# Patient Record
Sex: MALE | Race: ASIAN | NOT HISPANIC OR LATINO | Employment: OTHER | URBAN - METROPOLITAN AREA
[De-identification: names, ages, dates, MRNs, and addresses within clinical notes are randomized per-mention and may not be internally consistent; named-entity substitution may affect disease eponyms.]

---

## 2019-06-06 ENCOUNTER — DOCUMENTATION ONLY (OUTPATIENT)
Dept: OTHER | Facility: CLINIC | Age: 79
End: 2019-06-06

## 2019-06-06 ENCOUNTER — AMBULATORY - HEALTHEAST (OUTPATIENT)
Dept: OTHER | Facility: CLINIC | Age: 79
End: 2019-06-06

## 2019-07-09 ASSESSMENT — MIFFLIN-ST. JEOR
SCORE: 1229.75
SCORE: 1229.75

## 2019-07-10 ASSESSMENT — MIFFLIN-ST. JEOR
SCORE: 1281
SCORE: 1281

## 2019-07-12 ASSESSMENT — MIFFLIN-ST. JEOR
SCORE: 1225.67
SCORE: 1225.67

## 2019-07-13 ASSESSMENT — MIFFLIN-ST. JEOR
SCORE: 1213.42
SCORE: 1213.42

## 2019-07-15 ASSESSMENT — MIFFLIN-ST. JEOR
SCORE: 1215.69
SCORE: 1215.69

## 2019-07-16 ENCOUNTER — SURGERY - HEALTHEAST (OUTPATIENT)
Dept: SURGERY | Facility: HOSPITAL | Age: 79
End: 2019-07-16

## 2019-07-16 ENCOUNTER — ANESTHESIA - HEALTHEAST (OUTPATIENT)
Dept: SURGERY | Facility: HOSPITAL | Age: 79
End: 2019-07-16

## 2019-07-16 ASSESSMENT — MIFFLIN-ST. JEOR
SCORE: 1220.22
SCORE: 1220.22

## 2019-07-17 ENCOUNTER — HOME CARE/HOSPICE - HEALTHEAST (OUTPATIENT)
Dept: HOME HEALTH SERVICES | Facility: HOME HEALTH | Age: 79
End: 2019-07-17

## 2019-07-18 ASSESSMENT — MIFFLIN-ST. JEOR
SCORE: 1283.27
SCORE: 1283.27

## 2019-07-19 ENCOUNTER — SURGERY - HEALTHEAST (OUTPATIENT)
Dept: SURGERY | Facility: HOSPITAL | Age: 79
End: 2019-07-19

## 2019-07-19 ENCOUNTER — ANESTHESIA - HEALTHEAST (OUTPATIENT)
Dept: SURGERY | Facility: HOSPITAL | Age: 79
End: 2019-07-19

## 2019-07-19 ASSESSMENT — MIFFLIN-ST. JEOR
SCORE: 1266.04
SCORE: 1266.04

## 2019-07-20 ASSESSMENT — MIFFLIN-ST. JEOR
SCORE: 1266.04
SCORE: 1266.04

## 2019-07-22 ASSESSMENT — MIFFLIN-ST. JEOR
SCORE: 1266.04
SCORE: 1266.04

## 2019-07-23 ASSESSMENT — MIFFLIN-ST. JEOR
SCORE: 1270.57
SCORE: 1270.57

## 2019-07-27 ENCOUNTER — HOME CARE/HOSPICE - HEALTHEAST (OUTPATIENT)
Dept: HOME HEALTH SERVICES | Facility: HOME HEALTH | Age: 79
End: 2019-07-27

## 2019-07-28 ENCOUNTER — HOME CARE/HOSPICE - HEALTHEAST (OUTPATIENT)
Dept: HOME HEALTH SERVICES | Facility: HOME HEALTH | Age: 79
End: 2019-07-28

## 2019-07-29 ENCOUNTER — COMMUNICATION - HEALTHEAST (OUTPATIENT)
Dept: VASCULAR SURGERY | Facility: CLINIC | Age: 79
End: 2019-07-29

## 2019-07-29 ENCOUNTER — HOSPITAL ENCOUNTER (INPATIENT)
Facility: CLINIC | Age: 79
End: 2019-07-29
Payer: MEDICARE

## 2019-08-05 ENCOUNTER — RECORDS - HEALTHEAST (OUTPATIENT)
Dept: ADMINISTRATIVE | Facility: OTHER | Age: 79
End: 2019-08-05

## 2019-08-31 ENCOUNTER — COMMUNICATION - HEALTHEAST (OUTPATIENT)
Dept: HOME HEALTH SERVICES | Facility: HOME HEALTH | Age: 79
End: 2019-08-31

## 2019-10-29 ENCOUNTER — HOSPITAL ENCOUNTER (OUTPATIENT)
Dept: INTERVENTIONAL RADIOLOGY/VASCULAR | Facility: HOSPITAL | Age: 79
Discharge: HOME | End: 2019-10-29
Admitting: RADIOLOGY

## 2019-10-29 ENCOUNTER — HOSPITAL ENCOUNTER (OUTPATIENT)
Dept: INTERVENTIONAL RADIOLOGY/VASCULAR | Facility: HOSPITAL | Age: 79
Discharge: HOME | End: 2019-10-29
Attending: NURSE PRACTITIONER

## 2019-10-29 DIAGNOSIS — E46 MALNUTRITION, UNSPECIFIED TYPE (H): ICD-10-CM

## 2019-10-29 DIAGNOSIS — I63.512 ACUTE ISCHEMIC CEREBROVASCULAR ACCIDENT (CVA) INVOLVING LEFT MIDDLE CEREBRAL ARTERY TERRITORY (H): ICD-10-CM

## 2019-12-11 ENCOUNTER — COMMUNICATION - HEALTHEAST (OUTPATIENT)
Dept: SCHEDULING | Facility: CLINIC | Age: 79
End: 2019-12-11

## 2021-01-22 ENCOUNTER — RECORDS - HEALTHEAST (OUTPATIENT)
Dept: LAB | Facility: CLINIC | Age: 81
End: 2021-01-22

## 2021-01-23 ENCOUNTER — RECORDS - HEALTHEAST (OUTPATIENT)
Dept: LAB | Facility: CLINIC | Age: 81
End: 2021-01-23

## 2021-01-24 ENCOUNTER — RECORDS - HEALTHEAST (OUTPATIENT)
Dept: LAB | Facility: CLINIC | Age: 81
End: 2021-01-24

## 2021-01-24 LAB — INR PPP: 1.88 (ref 0.9–1.1)

## 2021-01-25 LAB
HGB BLD-MCNC: 12.2 G/DL (ref 14–18)
INR PPP: 1.82 (ref 0.9–1.1)

## 2021-01-28 ENCOUNTER — RECORDS - HEALTHEAST (OUTPATIENT)
Dept: LAB | Facility: CLINIC | Age: 81
End: 2021-01-28

## 2021-01-28 LAB
ERYTHROCYTE [DISTWIDTH] IN BLOOD BY AUTOMATED COUNT: 17.2 % (ref 11–14.5)
HCT VFR BLD AUTO: 37.8 % (ref 40–54)
HGB BLD-MCNC: 12.5 G/DL (ref 14–18)
INR PPP: 2.74 (ref 0.9–1.1)
MCH RBC QN AUTO: 30 PG (ref 27–34)
MCHC RBC AUTO-ENTMCNC: 33.1 G/DL (ref 32–36)
MCV RBC AUTO: 91 FL (ref 80–100)
PLATELET # BLD AUTO: 249 THOU/UL (ref 140–440)
PMV BLD AUTO: 10.8 FL (ref 8.5–12.5)
RBC # BLD AUTO: 4.16 MILL/UL (ref 4.4–6.2)
WBC: 12.7 THOU/UL (ref 4–11)

## 2021-01-29 ENCOUNTER — RECORDS - HEALTHEAST (OUTPATIENT)
Dept: LAB | Facility: CLINIC | Age: 81
End: 2021-01-29

## 2021-01-29 LAB
INR PPP: 1.86 (ref 0.9–1.1)
SODIUM SERPL-SCNC: 132 MMOL/L (ref 136–145)

## 2021-01-30 ENCOUNTER — RECORDS - HEALTHEAST (OUTPATIENT)
Dept: LAB | Facility: CLINIC | Age: 81
End: 2021-01-30

## 2021-01-30 LAB — INR PPP: 1.66 (ref 0.9–1.1)

## 2021-01-31 LAB — INR PPP: 1.77 (ref 0.9–1.1)

## 2021-02-18 ENCOUNTER — RECORDS - HEALTHEAST (OUTPATIENT)
Dept: LAB | Facility: CLINIC | Age: 81
End: 2021-02-18

## 2021-02-18 LAB
SARS-COV-2 PCR COMMENT: NORMAL
SARS-COV-2 RNA SPEC QL NAA+PROBE: NEGATIVE
SARS-COV-2 VIRUS SPECIMEN SOURCE: NORMAL

## 2021-02-19 LAB
ANION GAP SERPL CALCULATED.3IONS-SCNC: 7 MMOL/L (ref 5–18)
BUN SERPL-MCNC: 22 MG/DL (ref 8–28)
CALCIUM SERPL-MCNC: 8.6 MG/DL (ref 8.5–10.5)
CHLORIDE BLD-SCNC: 103 MMOL/L (ref 98–107)
CO2 SERPL-SCNC: 26 MMOL/L (ref 22–31)
CREAT SERPL-MCNC: 0.75 MG/DL (ref 0.7–1.3)
ERYTHROCYTE [DISTWIDTH] IN BLOOD BY AUTOMATED COUNT: 16.6 % (ref 11–14.5)
GFR SERPL CREATININE-BSD FRML MDRD: >60 ML/MIN/1.73M2
GLUCOSE BLD-MCNC: 94 MG/DL (ref 70–125)
HCT VFR BLD AUTO: 32.7 % (ref 40–54)
HGB BLD-MCNC: 10.2 G/DL (ref 14–18)
MCH RBC QN AUTO: 28.8 PG (ref 27–34)
MCHC RBC AUTO-ENTMCNC: 31.2 G/DL (ref 32–36)
MCV RBC AUTO: 92 FL (ref 80–100)
PLATELET # BLD AUTO: 353 THOU/UL (ref 140–440)
PMV BLD AUTO: 10.6 FL (ref 8.5–12.5)
POTASSIUM BLD-SCNC: 4.8 MMOL/L (ref 3.5–5)
RBC # BLD AUTO: 3.54 MILL/UL (ref 4.4–6.2)
SODIUM SERPL-SCNC: 136 MMOL/L (ref 136–145)
WBC: 8.6 THOU/UL (ref 4–11)

## 2021-02-24 ENCOUNTER — RECORDS - HEALTHEAST (OUTPATIENT)
Dept: LAB | Facility: CLINIC | Age: 81
End: 2021-02-24

## 2021-02-25 LAB
FERRITIN SERPL-MCNC: 394 NG/ML (ref 27–300)
FOLATE SERPL-MCNC: 11.4 NG/ML
HGB BLD-MCNC: 10.3 G/DL (ref 14–18)
IRON SATN MFR SERPL: 28 % (ref 20–50)
IRON SERPL-MCNC: 84 UG/DL (ref 42–175)
LEVETIRACETAM (KEPPRA): 21.3 UG/ML (ref 6–46)
TIBC SERPL-MCNC: 300 UG/DL (ref 313–563)
TRANSFERRIN SERPL-MCNC: 240 MG/DL (ref 212–360)
VIT B12 SERPL-MCNC: 836 PG/ML (ref 213–816)

## 2021-03-04 ENCOUNTER — RECORDS - HEALTHEAST (OUTPATIENT)
Dept: LAB | Facility: CLINIC | Age: 81
End: 2021-03-04

## 2021-03-05 LAB
ANION GAP SERPL CALCULATED.3IONS-SCNC: 10 MMOL/L (ref 5–18)
BUN SERPL-MCNC: 26 MG/DL (ref 8–28)
C REACTIVE PROTEIN LHE: 0.5 MG/DL (ref 0–0.8)
CALCIUM SERPL-MCNC: 8.3 MG/DL (ref 8.5–10.5)
CHLORIDE BLD-SCNC: 103 MMOL/L (ref 98–107)
CO2 SERPL-SCNC: 24 MMOL/L (ref 22–31)
CREAT SERPL-MCNC: 0.72 MG/DL (ref 0.7–1.3)
GFR SERPL CREATININE-BSD FRML MDRD: >60 ML/MIN/1.73M2
GLUCOSE BLD-MCNC: 140 MG/DL (ref 70–125)
MAGNESIUM SERPL-MCNC: 1.8 MG/DL (ref 1.8–2.6)
POTASSIUM BLD-SCNC: 4.3 MMOL/L (ref 3.5–5)
SODIUM SERPL-SCNC: 137 MMOL/L (ref 136–145)

## 2021-05-24 ENCOUNTER — RECORDS - HEALTHEAST (OUTPATIENT)
Dept: ADMINISTRATIVE | Facility: CLINIC | Age: 81
End: 2021-05-24

## 2021-05-25 ENCOUNTER — RECORDS - HEALTHEAST (OUTPATIENT)
Dept: ADMINISTRATIVE | Facility: CLINIC | Age: 81
End: 2021-05-25

## 2021-05-26 ENCOUNTER — RECORDS - HEALTHEAST (OUTPATIENT)
Dept: ADMINISTRATIVE | Facility: CLINIC | Age: 81
End: 2021-05-26

## 2021-05-29 ENCOUNTER — RECORDS - HEALTHEAST (OUTPATIENT)
Dept: ADMINISTRATIVE | Facility: CLINIC | Age: 81
End: 2021-05-29

## 2021-05-30 NOTE — ANESTHESIA PREPROCEDURE EVALUATION
Anesthesia Evaluation        Airway   Mallampati: I  Neck ROM: full   Pulmonary - normal exam                          Cardiovascular - normal exam  Exercise tolerance: > or = 4 METS  (+) hypertension, valvular problems/murmurs (History of MVR), dysrhythmias (Afib), , hypercholesterolemia,     (-) murmur  ECG reviewed (Afib at 66BPM)  Rhythm: irregular  Rate: normal,    no murmur   ROS comment: 2018 TTE  Normal left ventricular size. Mild left ventricular hypertrophy.  Left ventricle ejection fraction is normal. The calculated left ventricular ejection fraction is 61%.  Normal right ventricular systolic function.  Mild biatrial enlargement.  Mechanical valve in the mitral position. Normal prosthetic valve function. Mean gradient 7 mmHg.  Negative bubble study. No observed shunt.  When compared to the previous study dated 6/27/2012, no significant changes observed.     Neuro/Psych    (+) CVA ,     Endo/Other    (+) diabetes mellitus type 2 well controlled, hypothyroidism,      GI/Hepatic/Renal       Other findings: Results for MEDHAT HUNT (MRN [id number]) as of 7/16/2019 13:55    7/16/2019 06:38  Sodium: 136  Potassium: 4.1  Chloride: 104  CO2: 25  Anion Gap, Calculation: 7  BUN: 7 (L)  Creatinine: 0.78  GFR MDRD Af Amer: >60  GFR MDRD Non Af Amer: >60  Results for MEDHAT HUNT (MRN [id number]) as of 7/16/2019 13:55    7/16/2019 06:38  Glucose: 112  INR: 1.14 (H)  WBC: 5.8  RBC: 3.63 (L)  Hemoglobin: 11.0 (L)  Hematocrit: 32.8 (L)  MCV: 90  MCH: 30.3  MCHC: 33.5  RDW: 12.9  Platelets: 279  Results for MEDHAT HUNT (MRN [id number]) as of 7/16/2019 14:27    7/16/2019 06:38  INR: 1.14 (H)        Dental - normal exam                        Anesthesia Plan  Planned anesthetic: general LMA    ASA 3   Induction: intravenous   Anesthetic plan and risks discussed with: patient and spouse  Anesthesia plan special considerations: antiemetics,   Post-op plan: routine recovery

## 2021-05-30 NOTE — ANESTHESIA CARE TRANSFER NOTE
Last vitals:   Vitals:    07/19/19 0442   BP: (!) 149/91   Pulse: (!) 106   Resp: 16   Temp: 37.6  C (99.6  F)   SpO2: 100%     Patient spontaneous RR, -300s, suctioned, following commands, extubated to facemask 10LPM, O2 sats 100%. VSS. Report to RN.    Patient's level of consciousness is drowsy  Spontaneous respirations: yes  Maintains airway independently: yes  Dentition unchanged: yes  Oropharynx: oropharynx clear of all foreign objects    QCDR Measures:  ASA# 20 - Surgical Safety Checklist: WHO surgical safety checklist completed prior to induction    PQRS# 430 - Adult PONV Prevention: 4558F - Pt received => 2 anti-emetic agents (different classes) preop & intraop  ASA# 8 - Peds PONV Prevention: NA - Not pediatric patient, not GA or 2 or more risk factors NOT present  PQRS# 424 - Preeti-op Temp Management: 4559F - At least one body temp DOCUMENTED => 35.5C or 95.9F within required timeframe  PQRS# 426 - PACU Transfer Protocol: - Transfer of care checklist used  ASA# 14 - Acute Post-op Pain: ASA14B - Patient did NOT experience pain >= 7 out of 10

## 2021-05-30 NOTE — ANESTHESIA POSTPROCEDURE EVALUATION
Patient: Javon Cota  REPAIR OF CYSTOTOMY, ABDOMINAL, LAPAROTOMY, EXPLORATORY, CYSTOSCOPY  Anesthesia type: general    Patient location: PACU  Last vitals:   Vitals Value Taken Time   /78 7/19/2019  5:20 AM   Temp 37.6  C (99.6  F) 7/19/2019  4:42 AM   Pulse 102 7/19/2019  5:20 AM   Resp 17 7/19/2019  5:20 AM   SpO2 99 % 7/19/2019  5:20 AM     Post vital signs: stable  Level of consciousness: awake and responds to simple questions  Post-anesthesia pain: pain controlled  Post-anesthesia nausea and vomiting: no  Pulmonary: unassisted, return to baseline  Cardiovascular: stable and blood pressure at baseline  Hydration: adequate  Anesthetic events: no    QCDR Measures:  ASA# 11 - Preeti-op Cardiac Arrest: ASA11B - Patient did NOT experience unanticipated cardiac arrest  ASA# 12 - Preeti-op Mortality Rate: ASA12B - Patient did NOT die  ASA# 13 - PACU Re-Intubation Rate: ASA13B - Patient did NOT require a new airway mgmt  ASA# 10 - Composite Anes Safety: ASA10A - No serious adverse event    Additional Notes:

## 2021-05-30 NOTE — ANESTHESIA PREPROCEDURE EVALUATION
Anesthesia Evaluation      Patient summary reviewed     Airway   Mallampati: II   Pulmonary - negative ROS and normal exam                          Cardiovascular - normal exam  (+) hypertension, ,     ECG reviewed (A-fib)     ROS comment: MVR  EF 61%     Neuro/Psych    (+) CVA ,     Endo/Other    (+) diabetes mellitus, hypothyroidism,      GI/Hepatic/Renal      Comments: Bladder perforation     Other findings: Hb 10.7, K 4.1      Dental - normal exam                        Anesthesia Plan  Planned anesthetic: general endotracheal    ASA 3   Induction: intravenous   Anesthetic plan and risks discussed with: patient  Anesthesia plan special considerations: rapid sequence induction,   Post-op plan: routine recovery

## 2021-05-30 NOTE — TELEPHONE ENCOUNTER
Received [facility] inpatient consult for left ICA stenosis from nurse Ni at [phone]. Paged out to Dr. Paiz who is on-call.

## 2021-05-30 NOTE — ANESTHESIA POSTPROCEDURE EVALUATION
Patient: Javon Cota  CYSTOSCOPY, WITH THROMBUS REMOVAL, BLADDER FULGERATION  Anesthesia type: general    Patient location: PACU  Last vitals:   Vitals Value Taken Time   /77 7/16/2019  7:21 PM   Temp 36.8  C (98.3  F) 7/16/2019  6:49 PM   Pulse 116 7/16/2019  7:28 PM   Resp 15 7/16/2019  7:28 PM   SpO2 99 % 7/16/2019  7:28 PM   Vitals shown include unvalidated device data.  Post vital signs: stable  Level of consciousness: awake and responds to simple questions  Post-anesthesia pain: pain controlled  Post-anesthesia nausea and vomiting: no  Pulmonary: unassisted, return to baseline  Cardiovascular: stable, blood pressure at baseline and continues in Afib with rates just above 100, giving oral BB medication now that was due at 9 PM  Hydration: adequate  Anesthetic events: no    QCDR Measures:  ASA# 11 - Preeti-op Cardiac Arrest: ASA11B - Patient did NOT experience unanticipated cardiac arrest  ASA# 12 - Preeti-op Mortality Rate: ASA12B - Patient did NOT die  ASA# 13 - PACU Re-Intubation Rate: ASA13B - Patient did NOT require a new airway mgmt  ASA# 10 - Composite Anes Safety: ASA10A - No serious adverse event    Additional Notes: overall doing well, urinary catheter with minimal bloody content, no nausea.

## 2021-05-30 NOTE — ANESTHESIA CARE TRANSFER NOTE
Last vitals:   Vitals:    07/16/19 1849   BP: 151/63   Pulse: (!) 101   Resp: 12   Temp: 36.8  C (98.3  F)   SpO2: 100%     Patient's level of consciousness is drowsy  Spontaneous respirations: yes  Maintains airway independently: yes  Dentition unchanged: yes  Oropharynx: oropharynx clear of all foreign objects    QCDR Measures:  ASA# 20 - Surgical Safety Checklist: WHO surgical safety checklist completed prior to induction    PQRS# 430 - Adult PONV Prevention: 4558F - Pt received => 2 anti-emetic agents (different classes) preop & intraop  ASA# 8 - Peds PONV Prevention: NA - Not pediatric patient, not GA or 2 or more risk factors NOT present  PQRS# 424 - Preeti-op Temp Management: 4559F - At least one body temp DOCUMENTED => 35.5C or 95.9F within required timeframe  PQRS# 426 - PACU Transfer Protocol: - Transfer of care checklist used  ASA# 14 - Acute Post-op Pain: ASA14B - Patient did NOT experience pain >= 7 out of 10

## 2021-05-31 NOTE — TELEPHONE ENCOUNTER
(CRS - please advise next steps as needed to /other clinicians)    Patient DC'd from [facility] on 8/[facility]/19 and there were no resumption of care orders for [facility].    Call placed to patient to see if they needed/wanted to resume home care so that orders could be sought.  At the time of call, [facility] was out to see patient to start cares.      Explained to patient that it was their choice of what home care services they would like to have.     Patient chose to stay with [facility] and would like to end services with [facility].    Call placed to Dr. Kang and left message with answering service to update for coordination of care.    Thank you

## 2021-06-02 NOTE — PROGRESS NOTES
Educated patient and spouse about new EnFit syringes and tubing. Spouse return demonstrated how to use it. Both patient and spouse verbalized understanding of teaching.    Amanda Schoenrock

## 2021-06-02 NOTE — PROCEDURES
[facility]    Procedure: IR Procedure Note  Date/Time: 10/29/2019 11:43 AM  Performed by: Edgard John  Authorized by: Edgard John       Universal Protocol    Site marked: Yes    Prior images obtained and reviewed: Yes    Required items: required blood products, implants, devices, and special equipment available    Patient identity confirmed: verbally with patient    Reevaluation: NA - No sedation, light sedation, or local anesthesia    Confirmation checklist: patient's identity using two indicators, relevant allergies, procedure was appropriate and matched the consent or emergent situation and correct equipment/implants were available    Time out: Immediately prior to procedure a time out was called to verify the correct patient, procedure, equipment, support staff and site/side marked as required    Universal Protocol: Joint Commission Universal Protocol was followed    Preparation: Patient was prepped and draped in the usual sterile fashion    Anesthesia  Local anesthesia used?: No    Sedation  Patient sedation: No  Specimens: none  Complications: None  Condition: Stable    Post-procedure    Patient tolerance: Patient tolerated the procedure well with no immediate complications   Length of time physician present for 1:1 monitoring during sedation: 0

## 2021-06-03 VITALS
HEIGHT: 64 IN | BODY MASS INDEX: 24.07 KG/M2 | WEIGHT: 141 LBS | HEIGHT: 64 IN | BODY MASS INDEX: 24.07 KG/M2 | WEIGHT: 141 LBS

## 2021-06-04 NOTE — TELEPHONE ENCOUNTER
Wife calling - no consent on file.  Verbal consent given by patient over the phone.    Wife says he has feeding tube in stomach and they want it removed so he can eat by mouth.  Says it was placed a couple of months ago while he was in the hospital after having a stroke.  He no longer gets home care visits.    Advised caller to contact patient's primary care physician about this.  They may need to see him in clinic and order any necessary testing to be sure he is safe to eat by mouth again.  Then it would be decided if the tube should be removed.  Caller agrees to call his primary care physician at [facility].    Arlen Jacques RN  Triage Nurse Advisor    Reason for Disposition    Cleaning and dressing the g-tube site (or j-tube), questions about     Questions about discontinuing tube feedings and transitioning to eating by mouth.    Protocols used: FEEDING TUBE SYMPTOMS AND [id number]

## 2022-06-02 ENCOUNTER — APPOINTMENT (OUTPATIENT)
Dept: CT IMAGING | Facility: HOSPITAL | Age: 82
DRG: 686 | End: 2022-06-02
Attending: EMERGENCY MEDICINE
Payer: COMMERCIAL

## 2022-06-02 ENCOUNTER — HOSPITAL ENCOUNTER (INPATIENT)
Facility: HOSPITAL | Age: 82
LOS: 11 days | Discharge: HOME | DRG: 686 | End: 2022-06-13
Attending: EMERGENCY MEDICINE | Admitting: INTERNAL MEDICINE
Payer: COMMERCIAL

## 2022-06-02 DIAGNOSIS — I48.20 CHRONIC ATRIAL FIBRILLATION (H): ICD-10-CM

## 2022-06-02 DIAGNOSIS — C68.9 TRANSITIONAL CELL CARCINOMA (H): Primary | ICD-10-CM

## 2022-06-02 DIAGNOSIS — C67.9 MALIGNANT NEOPLASM OF URINARY BLADDER, UNSPECIFIED SITE (H): ICD-10-CM

## 2022-06-02 DIAGNOSIS — K59.09 OTHER CONSTIPATION: ICD-10-CM

## 2022-06-02 DIAGNOSIS — E03.9 HYPOTHYROIDISM, UNSPECIFIED TYPE: ICD-10-CM

## 2022-06-02 DIAGNOSIS — I63.512 ACUTE ISCHEMIC CEREBROVASCULAR ACCIDENT (CVA) INVOLVING LEFT MIDDLE CEREBRAL ARTERY TERRITORY (H): ICD-10-CM

## 2022-06-02 DIAGNOSIS — R31.0 GROSS HEMATURIA: ICD-10-CM

## 2022-06-02 DIAGNOSIS — Z95.2 H/O MITRAL VALVE REPLACEMENT WITH MECHANICAL VALVE: ICD-10-CM

## 2022-06-02 PROBLEM — I10 HYPERTENSION: Status: ACTIVE | Noted: 2019-07-13

## 2022-06-02 PROBLEM — R31.9 HEMATURIA: Status: ACTIVE | Noted: 2019-07-10

## 2022-06-02 PROBLEM — Z93.4 GASTROJEJUNOSTOMY TUBE STATUS (H): Status: ACTIVE | Noted: 2019-08-08

## 2022-06-02 PROBLEM — Z86.73 HISTORY OF STROKE: Status: ACTIVE | Noted: 2022-06-02

## 2022-06-02 PROBLEM — I50.30 (HFPEF) HEART FAILURE WITH PRESERVED EJECTION FRACTION (H): Status: ACTIVE | Noted: 2018-06-25

## 2022-06-02 PROBLEM — N32.89 EXTRAPERITONEAL BLADDER PERFORATION: Status: ACTIVE | Noted: 2019-07-19

## 2022-06-02 PROBLEM — D62 ANEMIA DUE TO BLOOD LOSS, ACUTE: Status: ACTIVE | Noted: 2022-06-02

## 2022-06-02 LAB
ABO/RH(D): NORMAL
ALBUMIN UR-MCNC: 100 MG/DL
ANION GAP SERPL CALCULATED.3IONS-SCNC: 9 MMOL/L (ref 5–18)
ANTIBODY SCREEN: NEGATIVE
APPEARANCE UR: ABNORMAL
BACTERIA #/AREA URNS HPF: ABNORMAL /HPF
BASOPHILS # BLD AUTO: 0 10E3/UL (ref 0–0.2)
BASOPHILS NFR BLD AUTO: 1 %
BILIRUB UR QL STRIP: NEGATIVE
BUN SERPL-MCNC: 16 MG/DL (ref 8–28)
CALCIUM SERPL-MCNC: 9.4 MG/DL (ref 8.5–10.5)
CHLORIDE BLD-SCNC: 105 MMOL/L (ref 98–107)
CO2 SERPL-SCNC: 24 MMOL/L (ref 22–31)
COLOR UR AUTO: ABNORMAL
CREAT SERPL-MCNC: 0.89 MG/DL (ref 0.7–1.3)
EOSINOPHIL # BLD AUTO: 0.3 10E3/UL (ref 0–0.7)
EOSINOPHIL NFR BLD AUTO: 5 %
ERYTHROCYTE [DISTWIDTH] IN BLOOD BY AUTOMATED COUNT: 12.7 % (ref 10–15)
GFR SERPL CREATININE-BSD FRML MDRD: 87 ML/MIN/1.73M2
GLUCOSE BLD-MCNC: 122 MG/DL (ref 70–125)
GLUCOSE UR STRIP-MCNC: NEGATIVE MG/DL
HCT VFR BLD AUTO: 46.6 % (ref 40–53)
HGB BLD-MCNC: 15.3 G/DL (ref 13.3–17.7)
HGB UR QL STRIP: ABNORMAL
IMM GRANULOCYTES # BLD: 0 10E3/UL
IMM GRANULOCYTES NFR BLD: 0 %
INR PPP: 2.63 (ref 0.85–1.15)
KETONES UR STRIP-MCNC: NEGATIVE MG/DL
LEUKOCYTE ESTERASE UR QL STRIP: ABNORMAL
LYMPHOCYTES # BLD AUTO: 1.8 10E3/UL (ref 0.8–5.3)
LYMPHOCYTES NFR BLD AUTO: 27 %
MCH RBC QN AUTO: 30.2 PG (ref 26.5–33)
MCHC RBC AUTO-ENTMCNC: 32.8 G/DL (ref 31.5–36.5)
MCV RBC AUTO: 92 FL (ref 78–100)
MONOCYTES # BLD AUTO: 0.6 10E3/UL (ref 0–1.3)
MONOCYTES NFR BLD AUTO: 9 %
NEUTROPHILS # BLD AUTO: 3.9 10E3/UL (ref 1.6–8.3)
NEUTROPHILS NFR BLD AUTO: 58 %
NITRATE UR QL: NEGATIVE
NRBC # BLD AUTO: 0 10E3/UL
NRBC BLD AUTO-RTO: 0 /100
PH UR STRIP: 6.5 [PH] (ref 5–7)
PLATELET # BLD AUTO: 193 10E3/UL (ref 150–450)
POTASSIUM BLD-SCNC: 4 MMOL/L (ref 3.5–5)
RBC # BLD AUTO: 5.07 10E6/UL (ref 4.4–5.9)
RBC URINE: >182 /HPF
SARS-COV-2 RNA RESP QL NAA+PROBE: NEGATIVE
SODIUM SERPL-SCNC: 138 MMOL/L (ref 136–145)
SP GR UR STRIP: 1.01 (ref 1–1.03)
SPECIMEN EXPIRATION DATE: NORMAL
SQUAMOUS EPITHELIAL: <1 /HPF
UROBILINOGEN UR STRIP-MCNC: <2 MG/DL
WBC # BLD AUTO: 6.7 10E3/UL (ref 4–11)
WBC URINE: 30 /HPF

## 2022-06-02 PROCEDURE — 250N000013 HC RX MED GY IP 250 OP 250 PS 637: Performed by: EMERGENCY MEDICINE

## 2022-06-02 PROCEDURE — 36415 COLL VENOUS BLD VENIPUNCTURE: CPT | Performed by: EMERGENCY MEDICINE

## 2022-06-02 PROCEDURE — 96361 HYDRATE IV INFUSION ADD-ON: CPT

## 2022-06-02 PROCEDURE — 87635 SARS-COV-2 COVID-19 AMP PRB: CPT | Performed by: EMERGENCY MEDICINE

## 2022-06-02 PROCEDURE — 96360 HYDRATION IV INFUSION INIT: CPT

## 2022-06-02 PROCEDURE — 87086 URINE CULTURE/COLONY COUNT: CPT | Performed by: EMERGENCY MEDICINE

## 2022-06-02 PROCEDURE — 120N000001 HC R&B MED SURG/OB

## 2022-06-02 PROCEDURE — 81001 URINALYSIS AUTO W/SCOPE: CPT | Performed by: EMERGENCY MEDICINE

## 2022-06-02 PROCEDURE — 99223 1ST HOSP IP/OBS HIGH 75: CPT | Performed by: INTERNAL MEDICINE

## 2022-06-02 PROCEDURE — C9803 HOPD COVID-19 SPEC COLLECT: HCPCS

## 2022-06-02 PROCEDURE — 250N000011 HC RX IP 250 OP 636: Performed by: EMERGENCY MEDICINE

## 2022-06-02 PROCEDURE — 80048 BASIC METABOLIC PNL TOTAL CA: CPT | Performed by: EMERGENCY MEDICINE

## 2022-06-02 PROCEDURE — 74178 CT ABD&PLV WO CNTR FLWD CNTR: CPT

## 2022-06-02 PROCEDURE — 85025 COMPLETE CBC W/AUTO DIFF WBC: CPT | Performed by: EMERGENCY MEDICINE

## 2022-06-02 PROCEDURE — 85610 PROTHROMBIN TIME: CPT | Performed by: EMERGENCY MEDICINE

## 2022-06-02 PROCEDURE — 86850 RBC ANTIBODY SCREEN: CPT | Performed by: EMERGENCY MEDICINE

## 2022-06-02 PROCEDURE — 99285 EMERGENCY DEPT VISIT HI MDM: CPT | Mod: 25

## 2022-06-02 RX ORDER — WARFARIN SODIUM 5 MG/1
5 TABLET ORAL SEE ADMIN INSTRUCTIONS
Status: DISCONTINUED | OUTPATIENT
Start: 2022-06-02 | End: 2022-06-02 | Stop reason: DRUGHIGH

## 2022-06-02 RX ORDER — CALCIUM CARBONATE 500 MG/1
1000 TABLET, CHEWABLE ORAL 4 TIMES DAILY PRN
Status: DISCONTINUED | OUTPATIENT
Start: 2022-06-02 | End: 2022-06-13 | Stop reason: HOSPADM

## 2022-06-02 RX ORDER — POLYETHYLENE GLYCOL 3350 17 G/17G
17 POWDER, FOR SOLUTION ORAL 2 TIMES DAILY PRN
Status: DISCONTINUED | OUTPATIENT
Start: 2022-06-02 | End: 2022-06-02

## 2022-06-02 RX ORDER — LEVOTHYROXINE SODIUM 25 UG/1
50 TABLET ORAL
Status: DISCONTINUED | OUTPATIENT
Start: 2022-06-03 | End: 2022-06-13 | Stop reason: HOSPADM

## 2022-06-02 RX ORDER — OXYCODONE HYDROCHLORIDE 5 MG/1
5-10 TABLET ORAL EVERY 4 HOURS PRN
Status: DISCONTINUED | OUTPATIENT
Start: 2022-06-02 | End: 2022-06-02

## 2022-06-02 RX ORDER — ACETAMINOPHEN 650 MG/1
650 SUPPOSITORY RECTAL EVERY 6 HOURS PRN
Status: DISCONTINUED | OUTPATIENT
Start: 2022-06-02 | End: 2022-06-13 | Stop reason: HOSPADM

## 2022-06-02 RX ORDER — IOPAMIDOL 755 MG/ML
100 INJECTION, SOLUTION INTRAVASCULAR ONCE
Status: COMPLETED | OUTPATIENT
Start: 2022-06-02 | End: 2022-06-02

## 2022-06-02 RX ORDER — ATORVASTATIN CALCIUM 10 MG/1
10 TABLET, FILM COATED ORAL AT BEDTIME
Status: DISCONTINUED | OUTPATIENT
Start: 2022-06-03 | End: 2022-06-13 | Stop reason: HOSPADM

## 2022-06-02 RX ORDER — LEVOTHYROXINE SODIUM 25 UG/1
50 TABLET ORAL
Status: DISCONTINUED | OUTPATIENT
Start: 2022-06-03 | End: 2022-06-02

## 2022-06-02 RX ORDER — ATORVASTATIN CALCIUM 10 MG/1
10 TABLET, FILM COATED ORAL AT BEDTIME
Status: DISCONTINUED | OUTPATIENT
Start: 2022-06-02 | End: 2022-06-02

## 2022-06-02 RX ORDER — LANOLIN ALCOHOL/MO/W.PET/CERES
3 CREAM (GRAM) TOPICAL
Status: DISCONTINUED | OUTPATIENT
Start: 2022-06-02 | End: 2022-06-13 | Stop reason: HOSPADM

## 2022-06-02 RX ORDER — POLYETHYLENE GLYCOL 3350 17 G/17G
17 POWDER, FOR SOLUTION ORAL 2 TIMES DAILY PRN
Status: DISCONTINUED | OUTPATIENT
Start: 2022-06-02 | End: 2022-06-13 | Stop reason: HOSPADM

## 2022-06-02 RX ORDER — ONDANSETRON 4 MG/1
4 TABLET, ORALLY DISINTEGRATING ORAL EVERY 6 HOURS PRN
Status: DISCONTINUED | OUTPATIENT
Start: 2022-06-02 | End: 2022-06-13 | Stop reason: HOSPADM

## 2022-06-02 RX ORDER — METOPROLOL TARTRATE 25 MG/1
50 TABLET, FILM COATED ORAL 2 TIMES DAILY
Status: DISCONTINUED | OUTPATIENT
Start: 2022-06-02 | End: 2022-06-13 | Stop reason: HOSPADM

## 2022-06-02 RX ORDER — AMOXICILLIN 250 MG
1 CAPSULE ORAL DAILY
Status: DISCONTINUED | OUTPATIENT
Start: 2022-06-03 | End: 2022-06-08

## 2022-06-02 RX ORDER — LOSARTAN POTASSIUM AND HYDROCHLOROTHIAZIDE 12.5; 5 MG/1; MG/1
1 TABLET ORAL DAILY
Status: DISCONTINUED | OUTPATIENT
Start: 2022-06-03 | End: 2022-06-02 | Stop reason: ALTCHOICE

## 2022-06-02 RX ORDER — LOSARTAN POTASSIUM 50 MG/1
50 TABLET ORAL EVERY EVENING
COMMUNITY
Start: 2022-05-25

## 2022-06-02 RX ORDER — AMOXICILLIN 250 MG
1 CAPSULE ORAL DAILY
Status: DISCONTINUED | OUTPATIENT
Start: 2022-06-03 | End: 2022-06-02

## 2022-06-02 RX ORDER — OXYCODONE HYDROCHLORIDE 5 MG/1
5-10 TABLET ORAL EVERY 4 HOURS PRN
Status: DISCONTINUED | OUTPATIENT
Start: 2022-06-02 | End: 2022-06-13 | Stop reason: HOSPADM

## 2022-06-02 RX ORDER — WARFARIN SODIUM 2 MG/1
4 TABLET ORAL ONCE
Status: COMPLETED | OUTPATIENT
Start: 2022-06-02 | End: 2022-06-02

## 2022-06-02 RX ORDER — ACETAMINOPHEN 325 MG/1
650 TABLET ORAL EVERY 6 HOURS PRN
Status: DISCONTINUED | OUTPATIENT
Start: 2022-06-02 | End: 2022-06-13 | Stop reason: HOSPADM

## 2022-06-02 RX ORDER — ONDANSETRON 2 MG/ML
4 INJECTION INTRAMUSCULAR; INTRAVENOUS EVERY 6 HOURS PRN
Status: DISCONTINUED | OUTPATIENT
Start: 2022-06-02 | End: 2022-06-13 | Stop reason: HOSPADM

## 2022-06-02 RX ORDER — CHOLECALCIFEROL (VITAMIN D3) 50 MCG
1 TABLET ORAL DAILY
COMMUNITY

## 2022-06-02 RX ADMIN — WARFARIN SODIUM 4 MG: 2 TABLET ORAL at 23:11

## 2022-06-02 RX ADMIN — IOPAMIDOL 100 ML: 755 INJECTION, SOLUTION INTRAVENOUS at 19:49

## 2022-06-02 ASSESSMENT — ACTIVITIES OF DAILY LIVING (ADL): ADLS_ACUITY_SCORE: 35

## 2022-06-02 NOTE — ED PROVIDER NOTES
EMERGENCY DEPARTMENT ENCOUNTER            IMPRESSION:  Transitional bladder cancer  Anticoagulated  Gross hematuria      MEDICAL DECISION MAKING:  Patient with history of transitional bladder cancer here for evaluation of gross hematuria.  He is on Coumadin for mitral valve replacement.  [facility].  He is taking an immune modulator.    On exam his vital signs are normal.  There is no abdominal tenderness.  His genitals are normal.  Urine is a zinfandel color.     Chemistry and chemistry are normal    INR is 2.63    Urine shows gross hematuria but no evidence of infection    CT shows evidence of bladder mass with extension into the bladder wall    Management was discussed with on-call urology.  Urology recommended CBI overnight with urology consult in the morning    Jacques catheter was ordered    I have placed a call to the hospitalist              =================================================================  CHIEF COMPLAINT:  Chief Complaint   Patient presents with     Hematuria         HPI  Javon Cota is a 81 year old male with a history of acquired hypothyroidism, dyslipidemia, HTN, acute ischemic CVA, anemia, chronic a-fib, gross hematuria, perforation of bladder (2020), s/p GJ tube placement 2019, s/p mitral valve replacement (on Coumadin) and transitional cell carcinoma, who presents to the ED by [facility] for evaluation of hematuria.   Patient reports hematuria began at 1200 (~7 hours ago), but denies any associated pain. Patient denies any trauma or injury that could have contributed to onset of symptoms. Patient denies fever or any additional medical concerns or complaints at this time.     Per chart review, patient was recently seen for follow up 5/23/22 at [facility] oncology regarding his diagnosis of transitional cell carcinoma. This was previously diagnosed in April via PET scan. Patient has been placed on pembrolizumab 200 mg IV every 3  weeks for chemotherapy treatment. Plan is to follow up in 3 and 6 weeks for repeat CBC diff, chem 10, liver function test, TSH, and follow up with CNP. Response to chemotherapy will be assessed after 3 months with cystoscopy and CT chest, abdomen, pelvis.       I Veronicae Gryler am serving as a scribe to document services personally performed by Dr. Jose Pearson MD, based on my observation and the provider's statements to me. I, Dr. Jose Pearson MD attest that Elise Gryler is acting in a scribe capacity, has observed my performance of the services and has documented them in accordance with my direction.      REVIEW OF SYSTEMS   Constitutional: Denies fever, chills, unintentional weight loss or fatigue   Eyes: Denies visual changes or discharge    HENT: Denies sore throat, ear pain or neck pain  Respiratory: Denies cough or shortness of breath    Cardiovascular: Denies chest pain, palpitations or leg swelling  GI: Denies abdominal pain, nausea, vomiting, or dark, bloody stools.    : Denies, dysuria, or flank pain. Patient endorses hematuria.   Musculoskeletal: Denies any new back pain or new muscle/joint pains  Skin: Denies rash or wound  Neurologic: Denies current headache, new weakness, focal weakness, or sensory changes    Lymphatic: Denies swollen glands    Psychiatric: Denies depression, suicidal ideation or homicidal ideation.    Remainder of systems reviewed, unless noted in HPI all others negative.      PAST MEDICAL HISTORY:  History reviewed. No pertinent past medical history.    PAST SURGICAL HISTORY:  Past Surgical History:   Procedure Laterality Date     CYSTOSCOPY N/A 7/19/2019    Procedure: CYSTOSCOPY;  Surgeon: Emmett Almonte MD;  Location: [facility] [place];  Service: Urology     IR GASTRO JEJUNOSTOMY TUBE CHANGE  10/29/2019     IR GASTRO JEJUNOSTOMY TUBE PLACEMENT  8/6/2019     IR GJ TUBE INSERTION  8/6/2019     IR GJ TUBE REPLACEMENT  10/29/2019     IR T-FASTENER REMOVAL  10/29/2019     IR  "T-FASTENER REMOVAL  10/29/2019     LAPAROTOMY EXPLORATORY N/A 7/19/2019    Procedure: LAPAROTOMY, EXPLORATORY;  Surgeon: Emmett Almonte MD;  Location: [facility] [place];  Service: Urology     NJ CYSTOURETHROSCOPY W/IRRIG & EVAC CLOTS N/A 7/16/2019    Procedure: CYSTOSCOPY, WITH THROMBUS REMOVAL, BLADDER FULGERATION;  Surgeon: Ravinder Byrne MD;  Location: [facility] [place];  Service: Urology     REPLACE VALVE MITRAL           CURRENT MEDICATIONS:    amoxicillin (AMOXIL) 500 MG capsule  atorvastatin (LIPITOR) 10 MG tablet  levothyroxine (SYNTHROID, LEVOTHROID) 50 MCG tablet  losartan-hydrochlorothiazide (HYZAAR) 50-12.5 mg per tablet  metoprolol tartrate (LOPRESSOR) 50 MG tablet  neomycin-bacitracin-polymyxin (NEOSPORIN) ointment  oxyCODONE (ROXICODONE) 5 MG immediate release tablet  polyethylene glycol (MIRALAX) 17 gram packet  senna-docusate (SENNOSIDES-DOCUSATE SODIUM) 8.6-50 mg tablet  warfarin (COUMADIN/JANTOVEN) 2 MG tablet        ALLERGIES:  No Known Allergies    FAMILY HISTORY:  Family History   Problem Relation Age of Onset     Cataracts No family hx of      Glaucoma No family hx of      Macular Degeneration No family hx of        SOCIAL HISTORY:   Social History     Socioeconomic History     Marital status:    Tobacco Use     Smoking status: Never Smoker     Smokeless tobacco: Never Used   Substance and Sexual Activity     Alcohol use: No     Drug use: No       PHYSICAL EXAM:    BP (!) 114/95   Pulse 88   Temp 98.2  F (36.8  C) (Oral)   Resp 20   Ht 1.676 m (5' 6\")   Wt 55.3 kg (122 lb)   SpO2 100%   BMI 19.69 kg/m      Constitutional: Awake, alert, in no acute distress  Head: Normocephalic, atraumatic.  ENT: Mucous membranes moist. Posterior oropharynx appears normal.  Eyes: Pupils midrange and reactive ,no conjunctival discharge  Neck: No lymphadenopathy, no stridor, supple, no soft tissue swelling  Chest: No tenderness   Respiratory: Respirations even, unlabored. Lungs clear to " ascultation bilaterally, in no acute respiratory distress.  Cardiovascular: Regular rate and rhythm.+2 radial pulses, equal bilaterally.  No murmurs.   GI: Abdomen soft, non-tender to palpation in all 4 quadrants. No guarding or rebound. Bowel sounds intact on all 4 quadrants.  Faint gross hematuria at the meatus  Back: No CVA tenderness.    Musculoskeletal: Moves all 4 extremities equally, strength symmetrical on bilateral uppers and lowers.  No peripheral edema  Integument: Warm, dry. No rash. No bruising or petechiae.  Lymphatic: No cervical lymphadenopathy  Neurologic: Alert & oriented x 3. Normal speech. Grossly normal motor and sensory function. No focal deficits noted.  NIHSS = 0  Psychiatric: Normal mood and affect. Normal judgement.    ED COURSE:    7:05 PM I met with the patient to gather history and to perform my initial exam. I discussed the plan for care while in the Emergency Department. PPE (gloves,surgical mask) was worn during patient encounters.   9:25 PM I consulted with [facility] [place] [facility] on the phone regarding patient care.  9:34 PM I updated the patient.      LAB:  All pertinent labs reviewed and interpreted.  Results for orders placed or performed during the hospital encounter of 06/02/22   CT Urogram wo & w Contrast    Impression    IMPRESSION:  1.  Multifocal bladder masses with a dominant lesion right anterior portion of the bladder which has transmural extension through the bladder wall. There is hazy changes of the perivesicular fat which could reflect tumor extension. The other separate   bladder wall lesions are smaller but likely reflect the same process and represent multifocal transitional cell carcinoma.  2.  No lymphadenopathy or sclerotic bone lesions.  3.  No evidence for ureteral obstruction.  4.  Moderate cortical scarring right kidney with benign right renal cyst requires no follow-up. The left kidney appears normal.  5.  Increased stool volume throughout  the colon without obstruction.  6.  Mild prostate gland enlargement.   Basic metabolic panel   Result Value Ref Range    Sodium 138 136 - 145 mmol/L    Potassium 4.0 3.5 - 5.0 mmol/L    Chloride 105 98 - 107 mmol/L    Carbon Dioxide (CO2) 24 22 - 31 mmol/L    Anion Gap 9 5 - 18 mmol/L    Urea Nitrogen 16 8 - 28 mg/dL    Creatinine 0.89 0.70 - 1.30 mg/dL    Calcium 9.4 8.5 - 10.5 mg/dL    Glucose 122 70 - 125 mg/dL    GFR Estimate 87 >60 mL/min/1.73m2   Result Value Ref Range    INR 2.63 (H) 0.85 - 1.15   UA with Microscopic reflex to Culture    Specimen: Urine, Midstream   Result Value Ref Range    Color Urine Red (A) Colorless, Straw, Light Yellow, Yellow    Appearance Urine Cloudy (A) Clear    Glucose Urine Negative Negative mg/dL    Bilirubin Urine Negative Negative    Ketones Urine Negative Negative mg/dL    Specific Gravity Urine 1.009 1.001 - 1.030    Blood Urine >1.0 mg/dL (A) Negative    pH Urine 6.5 5.0 - 7.0    Protein Albumin Urine 100  (A) Negative mg/dL    Urobilinogen Urine <2.0 <2.0 mg/dL    Nitrite Urine Negative Negative    Leukocyte Esterase Urine 75 Denis/uL (A) Negative    Bacteria Urine Few (A) None Seen /HPF    RBC Urine >182 (H) <=2 /HPF    WBC Urine 30 (H) <=5 /HPF    Squamous Epithelials Urine <1 <=1 /HPF   CBC with platelets and differential   Result Value Ref Range    WBC Count 6.7 4.0 - 11.0 10e3/uL    RBC Count 5.07 4.40 - 5.90 10e6/uL    Hemoglobin 15.3 13.3 - 17.7 g/dL    Hematocrit 46.6 40.0 - 53.0 %    MCV 92 78 - 100 fL    MCH 30.2 26.5 - 33.0 pg    MCHC 32.8 31.5 - 36.5 g/dL    RDW 12.7 10.0 - 15.0 %    Platelet Count 193 150 - 450 10e3/uL    % Neutrophils 58 %    % Lymphocytes 27 %    % Monocytes 9 %    % Eosinophils 5 %    % Basophils 1 %    % Immature Granulocytes 0 %    NRBCs per 100 WBC 0 <1 /100    Absolute Neutrophils 3.9 1.6 - 8.3 10e3/uL    Absolute Lymphocytes 1.8 0.8 - 5.3 10e3/uL    Absolute Monocytes 0.6 0.0 - 1.3 10e3/uL    Absolute Eosinophils 0.3 0.0 - 0.7 10e3/uL     Absolute Basophils 0.0 0.0 - 0.2 10e3/uL    Absolute Immature Granulocytes 0.0 <=0.4 10e3/uL    Absolute NRBCs 0.0 10e3/uL   Adult Type and Screen   Result Value Ref Range    ABO/RH(D) A POS     Antibody Screen Negative Negative    SPECIMEN EXPIRATION DATE [id number]        RADIOLOGY:  Reviewed all pertinent imaging. Please see official radiology report.  CT Urogram wo & w Contrast   Final Result   IMPRESSION:   1.  Multifocal bladder masses with a dominant lesion right anterior portion of the bladder which has transmural extension through the bladder wall. There is hazy changes of the perivesicular fat which could reflect tumor extension. The other separate    bladder wall lesions are smaller but likely reflect the same process and represent multifocal transitional cell carcinoma.   2.  No lymphadenopathy or sclerotic bone lesions.   3.  No evidence for ureteral obstruction.   4.  Moderate cortical scarring right kidney with benign right renal cyst requires no follow-up. The left kidney appears normal.   5.  Increased stool volume throughout the colon without obstruction.   6.  Mild prostate gland enlargement.               MEDICATIONS GIVEN IN THE EMERGENCY:  Medications   iopamidol (ISOVUE-370) solution 100 mL (100 mLs Intravenous Given 6/2/22 1949)           NEW PRESCRIPTIONS STARTED AT TODAY'S ER VISIT:  New Prescriptions    No medications on file          FINAL DIAGNOSIS:    ICD-10-CM    1. Gross hematuria  R31.0    2. Malignant neoplasm of urinary bladder, unspecified site (H)  C67.9             At the conclusion of the encounter I discussed the results of all of the tests and the disposition. The questions were answered. The patient or family acknowledged understanding and was agreeable with the care plan.     NAME: Javon Cota  AGE: 81 year old male  YOB: 1940  MRN: [id number]  EVALUATION DATE & TIME: No admission date for patient encounter.    [facility]  [place]    ED PROVIDER: Jose Pearson M.D.      I, Elise Gryler, am serving as a scribe to document services personally performed by Dr. Jose Pearson based on my observation and the provider's statements to me. I, Jose Pearson MD attest that Elise Gryler is acting in a scribe capacity, has observed my performance of the services and has documented them in accordance with my direction.    Jose Pearson M.D.  Emergency Medicine  [facility] EMERGENCY DEPARTMENT  [address] [place] [id number]  [phone]  Dept: [phone]  6/2/2022         Jose Pearson MD  06/02/22 0739

## 2022-06-02 NOTE — ED TRIAGE NOTES
Patient arrives to triage from home via [facility] with chief compliant of blood in urine which started today.  Per EMS, , normal heart rate and sats 100% on RA.  Patient alert and oriented x4.

## 2022-06-02 NOTE — ED NOTES
"ED Provider In Triage Note  [facility]  Encounter Date: Jun 2, 2022    Chief Complaint   Patient presents with     Hematuria       Brief HPI:   Javon Cota is a 81 year old male presenting to the Emergency Department with a chief complaint of gross hematuria today . Pt on coumadin for mechanical heart valve.  Denies pain, fevers.    Brief Physical Exam:  BP (!) 141/83 (BP Location: Left arm)   Pulse 74   Temp 98.2  F (36.8  C) (Oral)   Resp 20   Ht 1.676 m (5' 6\")   Wt 55.3 kg (122 lb)   SpO2 96%   BMI 19.69 kg/m    General: Non-toxic appearing  HEENT: Atraumatic  Resp: No respiratory distress  Abdomen: Non-peritoneal  Neuro: Alert, oriented, answers questions appropriately  Psych: Behavior appropriate      Plan Initiated in Triage:  Orders Placed This Encounter     Basic metabolic panel     INR     UA with Microscopic reflex to Culture     CBC with platelets and differential       PIT Dispo:   Return to lobby while awaiting workup and ED bed availability    Justin Shabazz MD on 6/2/2022 at 4:17 PM    Patient was evaluated by the Physician in Triage due to a limitation of available rooms in the Emergency Department. A plan of care was discussed based on the information obtained on the initial evaluation and patient was consuled to return back to the Emergency Department lobby after this initial evalutaiton until results were obtained or a room became available in the Emergency Department. Patient was counseled not to leave prior to receiving the results of their workup.     Justin Shabazz MD  [facility] EMERGENCY DEPARTMENT  [address] [place] [id number]  [phone]     Justin Shabazz MD  06/02/22 1618    "

## 2022-06-03 ENCOUNTER — APPOINTMENT (OUTPATIENT)
Dept: ULTRASOUND IMAGING | Facility: HOSPITAL | Age: 82
DRG: 686 | End: 2022-06-03
Payer: COMMERCIAL

## 2022-06-03 LAB
ERYTHROCYTE [DISTWIDTH] IN BLOOD BY AUTOMATED COUNT: 12.7 % (ref 10–15)
HCT VFR BLD AUTO: 43.5 % (ref 40–53)
HGB BLD-MCNC: 14.5 G/DL (ref 13.3–17.7)
INR PPP: 2.59 (ref 0.85–1.15)
MCH RBC QN AUTO: 30.2 PG (ref 26.5–33)
MCHC RBC AUTO-ENTMCNC: 33.3 G/DL (ref 31.5–36.5)
MCV RBC AUTO: 91 FL (ref 78–100)
PLATELET # BLD AUTO: 178 10E3/UL (ref 150–450)
RBC # BLD AUTO: 4.8 10E6/UL (ref 4.4–5.9)
WBC # BLD AUTO: 8.8 10E3/UL (ref 4–11)

## 2022-06-03 PROCEDURE — 258N000003 HC RX IP 258 OP 636: Performed by: INTERNAL MEDICINE

## 2022-06-03 PROCEDURE — 120N000001 HC R&B MED SURG/OB

## 2022-06-03 PROCEDURE — 99233 SBSQ HOSP IP/OBS HIGH 50: CPT | Performed by: INTERNAL MEDICINE

## 2022-06-03 PROCEDURE — 3E1K78Z IRRIGATION OF GENITOURINARY TRACT USING IRRIGATING SUBSTANCE, VIA NATURAL OR ARTIFICIAL OPENING: ICD-10-PCS | Performed by: PHYSICIAN ASSISTANT

## 2022-06-03 PROCEDURE — 250N000013 HC RX MED GY IP 250 OP 250 PS 637: Performed by: INTERNAL MEDICINE

## 2022-06-03 PROCEDURE — 85610 PROTHROMBIN TIME: CPT | Performed by: INTERNAL MEDICINE

## 2022-06-03 PROCEDURE — 85027 COMPLETE CBC AUTOMATED: CPT | Performed by: INTERNAL MEDICINE

## 2022-06-03 PROCEDURE — 76857 US EXAM PELVIC LIMITED: CPT

## 2022-06-03 PROCEDURE — 36415 COLL VENOUS BLD VENIPUNCTURE: CPT | Performed by: INTERNAL MEDICINE

## 2022-06-03 RX ORDER — WARFARIN SODIUM 3 MG/1
3 TABLET ORAL
Status: COMPLETED | OUTPATIENT
Start: 2022-06-03 | End: 2022-06-03

## 2022-06-03 RX ORDER — LOSARTAN POTASSIUM 50 MG/1
50 TABLET ORAL DAILY
Status: DISCONTINUED | OUTPATIENT
Start: 2022-06-03 | End: 2022-06-13 | Stop reason: HOSPADM

## 2022-06-03 RX ADMIN — DEXTROSE AND SODIUM CHLORIDE: 5; 900 INJECTION, SOLUTION INTRAVENOUS at 11:54

## 2022-06-03 RX ADMIN — LOSARTAN POTASSIUM 50 MG: 50 TABLET, FILM COATED ORAL at 12:49

## 2022-06-03 RX ADMIN — SENNOSIDES AND DOCUSATE SODIUM 1 TABLET: 8.6; 5 TABLET ORAL at 09:04

## 2022-06-03 RX ADMIN — METOPROLOL TARTRATE 50 MG: 25 TABLET, FILM COATED ORAL at 09:04

## 2022-06-03 RX ADMIN — METOPROLOL TARTRATE 50 MG: 25 TABLET, FILM COATED ORAL at 00:32

## 2022-06-03 RX ADMIN — WARFARIN SODIUM 3 MG: 3 TABLET ORAL at 18:09

## 2022-06-03 RX ADMIN — POLYETHYLENE GLYCOL 3350 17 G: 17 POWDER, FOR SOLUTION ORAL at 21:14

## 2022-06-03 RX ADMIN — ATORVASTATIN CALCIUM 10 MG: 10 TABLET, FILM COATED ORAL at 21:07

## 2022-06-03 RX ADMIN — LEVOTHYROXINE SODIUM 50 MCG: 0.03 TABLET ORAL at 06:29

## 2022-06-03 RX ADMIN — METOPROLOL TARTRATE 50 MG: 25 TABLET, FILM COATED ORAL at 20:54

## 2022-06-03 ASSESSMENT — ACTIVITIES OF DAILY LIVING (ADL)
ADLS_ACUITY_SCORE: 37
ADLS_ACUITY_SCORE: 35
ADLS_ACUITY_SCORE: 35
ADLS_ACUITY_SCORE: 37
ADLS_ACUITY_SCORE: 35
ADLS_ACUITY_SCORE: 37

## 2022-06-03 NOTE — PHARMACY-ADMISSION MEDICATION HISTORY
Pharmacy Note - Admission Medication History    Pertinent Provider Information: Patient reports he had a stroke at our hospital when we held his warfarin so he's very concerned about holding warfarin.      ______________________________________________________________________    Prior To Admission (PTA) med list completed and updated in EMR.       PTA Med List   Medication Sig Last Dose     amoxicillin (AMOXIL) 500 MG capsule Take 2,000 mg by mouth once as needed (1 hour before dental appointments) More than a month at PRN     atorvastatin (LIPITOR) 10 MG tablet [ATORVASTATIN (LIPITOR) 10 MG TABLET] 1 tablet (10 mg total) by Enteral Tube route at bedtime. (Patient taking differently: Take 10 mg by mouth At Bedtime) 6/1/2022 at HS     levothyroxine (SYNTHROID, LEVOTHROID) 50 MCG tablet [LEVOTHYROXINE (SYNTHROID, LEVOTHROID) 50 MCG TABLET] 1 tablet (50 mcg total) by Enteral Tube route Daily at 6:00 am. (Patient taking differently: Take 50 mcg by mouth daily before breakfast) 6/2/2022 at AM     losartan (COZAAR) 50 MG tablet Take 50 mg by mouth every evening 6/1/2022 at PM     metoprolol tartrate (LOPRESSOR) 50 MG tablet [METOPROLOL TARTRATE (LOPRESSOR) 50 MG TABLET] 1 tablet (50 mg total) by Enteral Tube route 2 (two) times a day. (Patient taking differently: Take 50 mg by mouth 2 times daily) 6/2/2022 at AM     polyethylene glycol (MIRALAX) 17 gram packet [POLYETHYLENE GLYCOL (MIRALAX) 17 GRAM PACKET] 1 packet (17 g total) by Enteral Tube route daily as needed. (Patient taking differently: 17 g by Per Feeding Tube route daily as needed for constipation) Unknown at PRN     senna-docusate (SENNOSIDES-DOCUSATE SODIUM) 8.6-50 mg tablet [SENNA-DOCUSATE (SENNOSIDES-DOCUSATE SODIUM) 8.6-50 MG TABLET] 1 tablet by Enteral Tube route 2 (two) times a day. 7/9/19 discharge orders - May use senna twice daily until you have had a couple soft stools (Patient taking differently: 1 tablet by Per Feeding Tube route daily) 6/2/2022 at  AM     vitamin D3 (CHOLECALCIFEROL) 50 mcg (2000 units) tablet Take 1 tablet by mouth daily 6/2/2022 at AM     warfarin (COUMADIN/JANTOVEN) 2 MG tablet [WARFARIN (COUMADIN/JANTOVEN) 2 MG TABLET] 2.5 tablets (5 mg total) by Enteral Tube route See Admin Instructions. Take 4 mg by mouth daily. Adjust dose based on INR,  INR goal 2.5-3.5 (Patient taking differently: Take 2 tablets (4mg) by mouth every Sunday & Thursday. Take 1 and 1/2 tablets (3mg) by mouth daily on all other days or as directed) 6/1/2022 at 1800       Information source(s): Patient and CareEverywhere/SureScripts  Method of interview communication: in-person with N95 mask and faceshield    Summary of Changes to PTA Med List  New: vitamin D  Discontinued: neosporin, oxycodone  Changed: warfarin 5mg daily to 4mg Sun & Thurs and 3mg all other days, Senna-docusate BID to daily, losartan/HCTZ 50-12.5mg daily to losartan 50mg daily    Patient was asked about OTC/herbal products specifically.  PTA med list reflects this.     Allergies were reviewed, assessed, and updated with the patient.      Patient does not use any multi-dose medications prior to admission.    The information provided in this note is only as accurate as the sources available at the time of the update(s).    Thank you for the opportunity to participate in the care of this patient.    Soumya Tony [facility]  6/2/2022 10:25 PM

## 2022-06-03 NOTE — CONSULTS
Brief Urology Note    Patient presented to ED for 1 day history of gross hematuria. He is emptying his bladder well, he has not been passing any clots.  CT scan notable for multifocal bladder masses, likely the cause of his hematuria. (The patient follows with [facility] urology).     Creatinine 0.89, HGB 15.3, WBC 6.7. He is hemodynamically stable. Patient is also on warfarin for mechanical heart valve, INR 2.6.    Discussed patient with the ED provider. Plan to admit patient to the hospital and start CBI on high flow tonight. Orders have been placed regarding CBI management.  We will evaluate the patient in the morning and place a formal consult note at that time.    Malissa Rojas PA-C  [facility]  [phone]

## 2022-06-03 NOTE — PROGRESS NOTES
EXAM: US BLADDER ONLY  LOCATION: [facility]  DATE/TIME: 6/3/2022 12:35 PM     INDICATION: hematuria, concerns for large bladder clot, CBI running, admitted with hematuria, h o bladder tumor and previous bladder rupture  COMPARISON: None.  TECHNIQUE: Routine.     FINDINGS: Bladder is completely decompressed with a Jacques catheter. Minimal amount of debris. Nothing concerning for a hematoma.                                                                      IMPRESSION:  1.  Decompressed bladder.     2.  No hematoma.    Urology has no plan for surgery.  Start on regular diet.

## 2022-06-03 NOTE — ED NOTES
"..  [facility] ED Handoff Report    ED Chief Complaint: Patient arrives to triage from home via [facility] with chief compliant of blood in urine which started today.  Per EMS, , normal heart rate and sats 100% on RA.  Patient alert and oriented x4.    ED Diagnosis:  (R31.0) Gross hematuria  Comment: Currently CBI  Plan: Admission    (C67.9) Malignant neoplasm of urinary bladder, unspecified site (H)  Comment: CBI  Plan: Admission       PMH:    Past Medical History:   Diagnosis Date     (HFpEF) heart failure with preserved ejection fraction (H) 6/25/2018     Acquired hypothyroidism 7/29/2019     Acute ischemic cerebrovascular accident (CVA) involving left middle cerebral artery territory (H) 8/2/2018     Chronic atrial fibrillation (H) 8/2/2018     Extraperitoneal bladder perforation 7/19/2019    Formatting of this note might be different from the original. IMO SNOMED LOAD SPRING 2020 [.6/.18/.2020 9:04 PM]  Hunter Lei:     H/O mitral valve replacement with mechanical valve 8/2/2018    Formatting of this note might be different from the original. [facility] Lazaro Bileaflet mechanical valve 1985     Malignant neoplasm of urinary bladder, unspecified site (H) 6/2/2022     Transitional cell carcinoma (H) 7/15/2019        Code Status:  Full Code     Falls Risk: Yes Band: Applied    Current Living Situation/Residence: lives with a significant other     Elimination Status: Continent: indwelling catheter     Activity Level: SBA w/ walker    Patients Preferred Language:  English     Needed: No    Vital Signs:  BP (!) 169/72   Pulse 73   Temp 98.1  F (36.7  C) (Oral)   Resp 20   Ht 1.676 m (5' 6\")   Wt 55.3 kg (122 lb)   SpO2 99%   BMI 19.69 kg/m       Cardiac Rhythm: N/A    Pain Score: 0/10    Is the Patient Confused:  No    Last Food or Drink: 6/2/22 at Early morning    Focused Assessment:      Tests Performed: Done: Labs and Imaging    Treatments Provided:  Supportive    Family " Dynamics/Concerns: No    Family Updated On Visitor Policy: Yes    Plan of Care Communicated to Family: Yes    Who Was Updated about Plan of Care: spouse    Belongings Checklist Done and Signed by Patient: No    Medications sent with patient: Yes    Covid: asymptomatic , negative    Additional Information: N/A    RN: Bhavana Morse RN   6/3/2022 3:23 PM

## 2022-06-03 NOTE — PROGRESS NOTES
Hospitalist Progress Note  ADMIT DATE: 6/2/2022     FACILITY: [facility]    [facility] [place], [phone]    Assessment/Plan    Javon Cota is a 81 year old male with history of known bladder cancer, prior bladder perforation while on CBI, history of mechanical mitral valve replacement on warfarin, chronic A. fib, prior stroke, hypothyroidism and heart failure with preserved EF presents with gross hematuria      Gross hematuria: In the setting of known transitional cell carcinoma.  Bleeding started around noon on 6/2/22.  Patient currently undergoing treatment with pembrolizumab  Hemoglobin noted to be 15 on admission  CT urogram shows transmural extension of multifocal bladder masses through the bladder wall, no evidence of ureteral obstruction  --Urology consulted: recommend starting CBI. Patient has history of bladder perforation with CBI in the past  -- Trend hemoglobin   -- Continue home warfarin given history of mechanical mitral valve  -- If hematuria does not resolve with CBI then will need to consider holding warfarin with heparin bridging since patient does have mechanical valve and prior stroke  -- patient is consented for blood  -Urology plan for US  -Keep npo for now      History of mechanical MVR, heart failure with preserved EF and history of chronic A. Fib  --Continue home metoprolol and warfarin  -- Hold home losartan in the setting of gross blood loss, can resume as BP allows pending trend and hemodynamics     HTN urgency  -resume metoprolol and cozaar  -hydralazine prn     Prior stroke: Continue home statin      History of hypothyroidism: Continue home      Constipation: CT urogram notes moderate amount of stool throughout the colon  -- Laxatives ordered as needed     DVT PPX:  Continue warfarin for now      Patient speaks english ([place] But I had difficulty understanding because his voice is very soft with accent.  Wife is bedside and helped to communicate.    Code status:  Full code confirmed on admission    Barriers to Discharge:  Hematuria, urology further workup    Anticipated discharge date/Disposition: 2-3 days    Subjective  Would like to start diet. Explained that I am waiting urology plan.      Objective    Vital signs in last 24 hours  Temp:  [98.2  F (36.8  C)] 98.2  F (36.8  C)  Pulse:  [63-96] 75  Resp:  [20] 20  BP: (114-180)/(63-95) 167/88  SpO2:  [96 %-100 %] 100 % [unfilled] O2 Device: None (Room air)    Weight:   [unfilled] Weight change:     Intake/Output last 3 shifts  I/O last 3 completed shifts:  In: -   Out: 3025 [Urine:3025]  Body mass index is 19.69 kg/m .    Physical Exam    Physical Exam  General appearance: not in acute distress  HEENT: PERRL, EOMI  Lungs: Clear breath sounds in bilateral lung fields  Cardiovascular: Regular rate and rhythm, normal S1-S2  Abdomen: Soft, non tender, no distension, normal bowel sound  Musculoskeletal: No joint swelling  Skin: No rash and no edema  Neurology: grossly intact.  CBI in place with hematuria.       Pertinent Labs   Lab Results: personally reviewed.    Latest Reference Range & Units 06/03/22 06:11   WBC 4.0 - 11.0 10e3/uL 8.8   Hemoglobin 13.3 - 17.7 g/dL 14.5   Hematocrit 40.0 - 53.0 % 43.5   Platelet Count 150 - 450 10e3/uL 178   RBC Count 4.40 - 5.90 10e6/uL 4.80   MCV 78 - 100 fL 91   MCH 26.5 - 33.0 pg 30.2   MCHC 31.5 - 36.5 g/dL 33.3   RDW 10.0 - 15.0 % 12.7   INR 0.85 - 1.15  2.59 (H)   (H): Data is abnormally high    Medications  Scheduled Meds:    atorvastatin  10 mg Oral At Bedtime     levothyroxine  50 mcg Oral QAM AC     metoprolol tartrate  50 mg Oral BID     senna-docusate  1 tablet Oral Daily     Continuous Infusions:    - MEDICATION INSTRUCTIONS -       Warfarin Therapy Reminder       PRN Meds:.acetaminophen **OR** acetaminophen, calcium carbonate, melatonin, ondansetron **OR** ondansetron, oxyCODONE, - MEDICATION INSTRUCTIONS -,  polyethylene glycol, Warfarin Therapy Reminder    Pertinent Radiology   Radiology Resultspending    Advanced Care Planning:  Discharge planning discussed with patient and wife        [facility]  Blanca Gibson

## 2022-06-03 NOTE — CONSULTS
[facility] CONSULT     Type of Consult: inpatient   Place of Service:  [facility]  Reason for Consultation: Gross hematuria  Consult Requested by: Dr. Hartmann    History of Present Illness:    Javon Cota is a 81 year old male h/o recurrenttransitional cell carcinoma undergoing treatment with pembrolizumab, atrial fibrillation, stroke, mechanical valve on warfarin that is admitted for Hematuria. Urology has been consulted due to blood in the urine. History obtained through patient, and chart review.     Javon was admitted through the Emergency Departed currently boarding the in the ED. ED workup notable for Hg 15.3 yesterday,normal creatinine, abnormal UA with culture pending. CT urogram with      Patient reports that he first noted the blood in the urine yesterday. Patient reports the he has not seen clots. He denies difficulty with voiding and is without pain with urination. No associated fevers, abdominal pain, back pain or constitutional symptoms. He is without dizziness or shortness of breath. Per nursing CBI started last night, no manual bladder bladder irrigation by day shift nursing, uncertain if any from night shift.     Urologic history  H/o Bladder cancer, TURBT [facility] 2019, shortly after gross hematuria presenting to [facility],  failed CBI alone and underwent fulguration on 7/16/19 by Dr Byrne, he then developed urine leak due to bladder perforation, underwent operative repair by Dr Almonte on 7/19. He has known recurrent bladder cancer stating receiving Urologic care through [facility] and chemotherapy, unfortunately I do not have access to these records however per Admitting physician undergoing treatment with pembrolizumab for known transitional cell carcinoma         Past Medical history  Past Medical History:   Diagnosis Date     (HFpEF) heart failure with preserved ejection fraction (H) 6/25/2018     Acquired hypothyroidism 7/29/2019     Acute ischemic  cerebrovascular accident (CVA) involving left middle cerebral artery territory (H) 8/2/2018     Chronic atrial fibrillation (H) 8/2/2018     Extraperitoneal bladder perforation 7/19/2019    Formatting of this note might be different from the original. IMO SNOMED LOAD SPRING 2020 [.6/.18/.2020 9:04 PM]  Hunter Lei:     H/O mitral valve replacement with mechanical valve 8/2/2018    Formatting of this note might be different from the original. [employer] Lazaro Bileaflet mechanical valve 1985     Malignant neoplasm of urinary bladder, unspecified site (H) 6/2/2022     Transitional cell carcinoma (H) 7/15/2019       Past Surgical history  Past Surgical History:   Procedure Laterality Date     CYSTOSCOPY N/A 7/19/2019    Procedure: CYSTOSCOPY;  Surgeon: Emmett Almonte MD;  Location: [facility];  Service: Urology     IR GASTRO JEJUNOSTOMY TUBE CHANGE  10/29/2019     IR GASTRO JEJUNOSTOMY TUBE PLACEMENT  8/6/2019     IR GJ TUBE INSERTION  8/6/2019     IR GJ TUBE REPLACEMENT  10/29/2019     IR T-FASTENER REMOVAL  10/29/2019     IR T-FASTENER REMOVAL  10/29/2019     LAPAROTOMY EXPLORATORY N/A 7/19/2019    Procedure: LAPAROTOMY, EXPLORATORY;  Surgeon: Emmett Almonte MD;  Location: [facility];  Service: Urology     ND CYSTOURETHROSCOPY W/IRRIG & EVAC CLOTS N/A 7/16/2019    Procedure: CYSTOSCOPY, WITH THROMBUS REMOVAL, BLADDER FULGERATION;  Surgeon: Ravinder Byrne MD;  Location: [facility];  Service: Urology     REPLACE VALVE MITRAL         Social History  Social History     Tobacco Use     Smoking status: Never Smoker     Smokeless tobacco: Never Used   Substance Use Topics     Alcohol use: No     Drug use: No       Medications  Current Facility-Administered Medications   Medication     acetaminophen (TYLENOL) tablet 650 mg    Or     acetaminophen (TYLENOL) Suppository 650 mg     atorvastatin (LIPITOR) tablet 10 mg     calcium carbonate (TUMS) chewable tablet 1,000 mg     dextrose 5% and 0.9% NaCl  "infusion     levothyroxine (SYNTHROID/LEVOTHROID) tablet 50 mcg     losartan (COZAAR) tablet 50 mg     melatonin tablet 3 mg     metoprolol tartrate (LOPRESSOR) tablet 50 mg     ondansetron (ZOFRAN ODT) ODT tab 4 mg    Or     ondansetron (ZOFRAN) injection 4 mg     oxyCODONE (ROXICODONE) tablet 5-10 mg     Patient is already receiving anticoagulation with heparin, enoxaparin (LOVENOX), warfarin (COUMADIN)  or other anticoagulant medication     polyethylene glycol (MIRALAX) Packet 17 g     senna-docusate (SENOKOT-S/PERICOLACE) 8.6-50 MG per tablet 1 tablet     warfarin ANTICOAGULANT (COUMADIN) tablet 3 mg     Warfarin Therapy Reminder (Check START DATE - warfarin may be starting in the FUTURE)     Current Outpatient Medications   Medication     amoxicillin (AMOXIL) 500 MG capsule     atorvastatin (LIPITOR) 10 MG tablet     levothyroxine (SYNTHROID, LEVOTHROID) 50 MCG tablet     losartan (COZAAR) 50 MG tablet     metoprolol tartrate (LOPRESSOR) 50 MG tablet     polyethylene glycol (MIRALAX) 17 gram packet     senna-docusate (SENNOSIDES-DOCUSATE SODIUM) 8.6-50 mg tablet     vitamin D3 (CHOLECALCIFEROL) 50 mcg (2000 units) tablet     warfarin (COUMADIN/JANTOVEN) 2 MG tablet       Allergies  No Known Allergies    ROS:   12 point review of systems was taken and is negative aside from what is noted above in the HPI.     Physical Exam:  BP (!) 161/70   Pulse 75   Temp 98.1  F (36.7  C) (Oral)   Resp 20   Ht 1.676 m (5' 6\")   Wt 55.3 kg (122 lb)   SpO2 98%   BMI 19.69 kg/m    General: NAD, alert, cooperative  Head: normocephalic, without abnormality / atraumatic   Abdomen: soft, non tender,  non distended. no suprapubic fullness or tenderness. no CVA tenderness   Geniturinary: Circumcised penis,  3 way catheter with cherry colored urine in tubing  Extremities: no calf edema or tenderness  Skin: no rashes or lesions  Musculoskeletal: moves  extremities equally  Psychological: alert and oriented, answers questions " appropriately      Labs:   WBC Count   Date Value Ref Range Status   06/03/2022 8.8 4.0 - 11.0 10e3/uL Final     Hemoglobin   Date Value Ref Range Status   06/03/2022 14.5 13.3 - 17.7 g/dL Final     Creatinine   Date Value Ref Range Status   06/02/2022 0.89 0.70 - 1.30 mg/dL Final     INR/Prothrombin Time  INR   Date Value Ref Range Status   06/03/2022 2.59 (H) 0.85 - 1.15 Final          Cultures:  Urine culture: pending  Lab Results: personally reviewed     Imaging:  EXAM: CT UROGRAM WO and W CONTRAST  LOCATION: [facility]  DATE/TIME: 6/2/2022 7:33 PM  IMPRESSION:  1.  Multifocal bladder masses with a dominant lesion right anterior portion of the bladder which has transmural extension through the bladder wall. There is hazy changes of the perivesicular fat which could reflect tumor extension. The other separate   bladder wall lesions are smaller but likely reflect the same process and represent multifocal transitional cell carcinoma.  2.  No lymphadenopathy or sclerotic bone lesions.  3.  No evidence for ureteral obstruction.  4.  Moderate cortical scarring right kidney with benign right renal cyst requires no follow-up. The left kidney appears normal.  5.  Increased stool volume throughout the colon without obstruction.  6.  Mild prostate gland enlargement.  I have personally reviewed the imaging reports above.     Assessment/Plan: Javon Cota is being seen by [facility] for gross hematuria with known recurrent bladder cancer,undergoing treatment with pembrolizumab for known transitional cell carcinoma followed by [facility]     - Moderate amount of time spent manually irrigating 3 way pepper catheter with return of numerous small clots. There was some concerns for possible retained clot with irrigation, will obtain US to further evaluate. CT urogram yesterday without urologic stone or large clot burden, multifocal bladder masses seen which patient and spouse states  aware of.   -Suspect hematuria due to bladder mass.   -  Manually irrigate pepper catheter with 30 ml saline and aspirate (may repeat up to 6 times) scheduled q4 hours AND sooner if needed for deep red urine or large clots. This is in addition to CBI.Order placed   -Bladder US ordered to asses for large bladder clot, NPO until after US results.   -Urine culture pending, appreciate medicine to follow and treat accordingly.  - Javon has history of bladder perforation on CBI after bladder procedure, neverless nursing staff to continue to be vigilant of CBI and symptoms of bladder perforation such as abdominal distention, abdominal pain, bloating etc.   - Procedure / Surgery recommended: If hematuria not responding to CBI, large bladder clot present, Hg begins to trend down or patient become hymodynamically unstable he may benefit from cystoscopy, fulguration and possible clot evacution  - Old records reviewed - notes since admission        It was discussed with the patient that any degree of hematuria should not be ignored, as it may be the sign of serious renal or urologic disease including malignancy.        Thank you for consulting [facility] regarding this patient's care. Please contact us with questions or concerns.       Lilly Howard PA-C  [facility]  [phone]      Addendum 6/3/22 1:19 PM  EXAM: US BLADDER ONLY  LOCATION: [facility]  DATE/TIME: 6/3/2022 12:35 PM    IMPRESSION:  1.  Decompressed bladder.     2.  No hematoma.    Plan:  -Resume general diet  -  Manually irrigate pepper catheter with 30 ml saline and aspirate (may repeat up to 6 times) scheduled q4 hours AND sooner if needed for deep red urine or large clots. This is in addition to CBI.  -Continue CBI, plan to slowly wean tomorrow  -Urology will continue to follow

## 2022-06-03 NOTE — ED NOTES
After short term of CBI output has changed from Artur blood to clear drainage with a touch of pink provide notified

## 2022-06-03 NOTE — PHARMACY-ANTICOAGULATION SERVICE
Clinical Pharmacy - Warfarin Dosing Consult     Pharmacy has been consulted to manage this patient s warfarin therapy.  Indication: Mechanical Mitral Valve Replacement  Therapy Goal: INR 2.5-3.5  Warfarin Prior to Admission: Yes  Warfarin PTA Regimen: 4mg Sun/Thurs & 3mg all other days  Significant drug interactions: PTA levothyroxine  Dose Comments: INR at goal at 2.63, here for CBI for hematuria but continue home dose 4mg due to history of stroke when warfarin held    INR   Date Value Ref Range Status   06/02/2022 2.63 (H) 0.85 - 1.15 Final   01/31/2021 1.77 (H) 0.90 - 1.10 Final       Recommend warfarin 4 mg today.  Pharmacy will monitor Javon Cota daily and order warfarin doses to achieve specified goal.      Please contact pharmacy as soon as possible if the warfarin needs to be held for a procedure or if the warfarin goals change.

## 2022-06-03 NOTE — H&P
[facility] Medicine Admission History and Physical    Javon Cota  [address]  [place] [address]  : 1940  Admission Date/Time: 2022  6:41 PM    Primary Care Provider / Referring Physician: Wade, [facility] Attending Physician:  Giovanni Hartmann DO     Assessment:     Principal Problem:    Hematuria  Active Problems:    (HFpEF) heart failure with preserved ejection fraction (H)    Acquired hypothyroidism    Chronic atrial fibrillation (H)    H/O mitral valve replacement with mechanical valve    Hypertension    Transitional cell carcinoma (H)    Malignant neoplasm of urinary bladder, unspecified site (H)    History of stroke      Plan:    81-year-old male with history of known bladder cancer, prior bladder perforation while on CBI, history of mechanical mitral valve replacement on warfarin, chronic A. fib, prior stroke, hypothyroidism and heart failure with preserved EF presents with gross hematuria      Gross hematuria: In the setting of known transitional cell carcinoma.  Bleeding started around noon on 22.  Patient currently undergoing treatment with pembrolizumab  Hemoglobin noted to be 15 on admission  CT urogram shows transmural extension of multifocal bladder masses through the bladder wall, no evidence of ureteral obstruction  --Urology consulted and will see in the a.m., they recommend starting CBI this evening.  Patient tells me he has history of bladder perforation with CBI in the past, this was communicated to emergency room staff and they will monitor patient closely for any evidence of increased abdominal pain  -- Trend hemoglobin now and again in the morning  -- Continue home warfarin given history of mechanical mitral valve  -- If hematuria does not resolve with CBI then will need to consider holding warfarin with heparin bridging since patient does have mechanical valve and prior stroke  -- patient is consented for blood by  me        History of mechanical MVR, heart failure with preserved EF and history of chronic A. Fib  --Continue home metoprolol and warfarin  -- Hold home losartan in the setting of gross blood loss, can resume as BP allows pending trend and hemodynamics      Prior stroke: Continue home statin      History of hypothyroidism: Continue home      Constipation: CT urogram notes moderate amount of stool throughout the colon  -- Laxatives ordered as needed    DVT PPX:  Continue warfarin for now      Code status:  Full code confirmed on admission        Giovanni Hartmann D.O.          _____________________________________________________________  CHIEF COMPLAINT:    Gross hematuria    HPI:  81-year-old male with history of known bladder cancer, prior bladder perforation while on CBI, history of mechanical mitral valve replacement on warfarin, chronic A. fib, prior stroke, hypothyroidism and heart failure with preserved EF presents with gross hematuria  Bleeding started around noon on 6-22.  Patient currently undergoing treatment with pembrolizumab  Patient notes history of bladder perforation with CBI in the past  Currently the patient denies any associated complaints  Review of system positive for chronic right lower extremity mild weakness related to prior stroke  Remainder of ROS negative. Denies any other exacerbating or improving factors.      ALLERGIES/SENSITIVITIES: No Known Allergies    (Not in a hospital admission)      Past Medical History:   Diagnosis Date     (HFpEF) heart failure with preserved ejection fraction (H) 6/25/2018     Acquired hypothyroidism 7/29/2019     Acute ischemic cerebrovascular accident (CVA) involving left middle cerebral artery territory (H) 8/2/2018     Chronic atrial fibrillation (H) 8/2/2018     Extraperitoneal bladder perforation 7/19/2019    Formatting of this note might be different from the original. IMO SNOMED LOAD SPRING 2020 [.6/.18/.2020 9:04 PM]  Hunter Lei:     H/O mitral  valve replacement with mechanical valve 8/2/2018    Formatting of this note might be different from the original. [employer] mechanical valve 1985     Malignant neoplasm of urinary bladder, unspecified site (H) 6/2/2022     Transitional cell carcinoma (H) 7/15/2019       Past Surgical History:   Procedure Laterality Date     CYSTOSCOPY N/A 7/19/2019    Procedure: CYSTOSCOPY;  Surgeon: Emmett Almonte MD;  Location: [facility];  Service: Urology     IR GASTRO JEJUNOSTOMY TUBE CHANGE  10/29/2019     IR GASTRO JEJUNOSTOMY TUBE PLACEMENT  8/6/2019     IR GJ TUBE INSERTION  8/6/2019     IR GJ TUBE REPLACEMENT  10/29/2019     IR T-FASTENER REMOVAL  10/29/2019     IR T-FASTENER REMOVAL  10/29/2019     LAPAROTOMY EXPLORATORY N/A 7/19/2019    Procedure: LAPAROTOMY, EXPLORATORY;  Surgeon: Emmett Almonte MD;  Location: [facility];  Service: Urology     UT CYSTOURETHROSCOPY W/IRRIG & EVAC CLOTS N/A 7/16/2019    Procedure: CYSTOSCOPY, WITH THROMBUS REMOVAL, BLADDER FULGERATION;  Surgeon: Ravinder Byrne MD;  Location: [facility];  Service: Urology     REPLACE VALVE MITRAL         REVIEW OF SYSTEMS:   Constitutional: no fever, chills, or sweats  Eyes: No visual disturbance or irritation  ENT: No nose or throat congestion or pain  Respiratory: No wheezes, cough, shortness of breath, or pain with breathing  Cardiovascular: No chest pain or palpitations  Gastrointestinal: No nausea, vomiting, diarrhea, dyspepsia, or pain  Genitourinary: As above  Integument/breast: No rash, pruritis, or lesion  Hematologic/lymphatic: No unusual bruising  Musculoskeletal: No joint swelling, pain, or unusual back pain  Neurological: No headache, new arm or leg numbness or weakness, dizziness, or new gait disturbance  Psychiatric: No anxiety, or depression, or hallucinations  Endocrine: No unusual fatigue, appetite disturbance, sleep disturbance, or unusual weight loss or gain  Allergic/Immunologic: No hives,  "allergic swelling or wheeze or rhinitis  All other systems on reveiw are negative.    Social History     Socioeconomic History     Marital status:      Spouse name: Not on file     Number of children: Not on file     Years of education: Not on file     Highest education level: Not on file   Occupational History     Not on file   Tobacco Use     Smoking status: Never Smoker     Smokeless tobacco: Never Used   Substance and Sexual Activity     Alcohol use: No     Drug use: No     Sexual activity: Not on file   Other Topics Concern     Not on file   Social History Narrative     Not on file     Social Determinants of Health     Financial Resource Strain: Not on file   Food Insecurity: Not on file   Transportation Needs: Not on file   Physical Activity: Not on file   Stress: Not on file   Social Connections: Not on file   Intimate Partner Violence: Not on file   Housing Stability: Not on file          Family History   Problem Relation Age of Onset     Cataracts No family hx of      Glaucoma No family hx of      Macular Degeneration No family hx of        PHYSICAL EXAM:  General Appearance: In no acute distress  BP (!) 114/95   Pulse 88   Temp 98.2  F (36.8  C) (Oral)   Resp 20   Ht 1.676 m (5' 6\")   Wt 55.3 kg (122 lb)   SpO2 100%   BMI 19.69 kg/m    EYES: Clear, without inflammation   RESPIRATORY: Respirations nonlabored  CARDIOVASCULAR: Trace le edema bilat.  ABDOMEN: soft and non-tender  RECTAL: deferred  GENITOURINARY: deferred  NEUROLOGIC: No focal arm weakness, speech is clear, has residual mild right lower extremity weakness      Labs Reviewed:   Recent Results (from the past 24 hour(s))   CBC with platelets and differential    Collection Time: 06/02/22  4:21 PM   Result Value Ref Range    WBC Count 6.7 4.0 - 11.0 10e3/uL    RBC Count 5.07 4.40 - 5.90 10e6/uL    Hemoglobin 15.3 13.3 - 17.7 g/dL    Hematocrit 46.6 40.0 - 53.0 %    MCV 92 78 - 100 fL    MCH 30.2 26.5 - 33.0 pg    MCHC 32.8 31.5 - 36.5 " g/dL    RDW 12.7 10.0 - 15.0 %    Platelet Count 193 150 - 450 10e3/uL    % Neutrophils 58 %    % Lymphocytes 27 %    % Monocytes 9 %    % Eosinophils 5 %    % Basophils 1 %    % Immature Granulocytes 0 %    NRBCs per 100 WBC 0 <1 /100    Absolute Neutrophils 3.9 1.6 - 8.3 10e3/uL    Absolute Lymphocytes 1.8 0.8 - 5.3 10e3/uL    Absolute Monocytes 0.6 0.0 - 1.3 10e3/uL    Absolute Eosinophils 0.3 0.0 - 0.7 10e3/uL    Absolute Basophils 0.0 0.0 - 0.2 10e3/uL    Absolute Immature Granulocytes 0.0 <=0.4 10e3/uL    Absolute NRBCs 0.0 10e3/uL   Adult Type and Screen    Collection Time: 06/02/22  4:21 PM   Result Value Ref Range    ABO/RH(D) A POS     Antibody Screen Negative Negative    SPECIMEN EXPIRATION DATE [id number]    INR    Collection Time: 06/02/22  4:29 PM   Result Value Ref Range    INR 2.63 (H) 0.85 - 1.15   UA with Microscopic reflex to Culture    Collection Time: 06/02/22  4:35 PM    Specimen: Urine, Midstream   Result Value Ref Range    Color Urine Red (A) Colorless, Straw, Light Yellow, Yellow    Appearance Urine Cloudy (A) Clear    Glucose Urine Negative Negative mg/dL    Bilirubin Urine Negative Negative    Ketones Urine Negative Negative mg/dL    Specific Gravity Urine 1.009 1.001 - 1.030    Blood Urine >1.0 mg/dL (A) Negative    pH Urine 6.5 5.0 - 7.0    Protein Albumin Urine 100  (A) Negative mg/dL    Urobilinogen Urine <2.0 <2.0 mg/dL    Nitrite Urine Negative Negative    Leukocyte Esterase Urine 75 Denis/uL (A) Negative    Bacteria Urine Few (A) None Seen /HPF    RBC Urine >182 (H) <=2 /HPF    WBC Urine 30 (H) <=5 /HPF    Squamous Epithelials Urine <1 <=1 /HPF   Basic metabolic panel    Collection Time: 06/02/22  6:05 PM   Result Value Ref Range    Sodium 138 136 - 145 mmol/L    Potassium 4.0 3.5 - 5.0 mmol/L    Chloride 105 98 - 107 mmol/L    Carbon Dioxide (CO2) 24 22 - 31 mmol/L    Anion Gap 9 5 - 18 mmol/L    Urea Nitrogen 16 8 - 28 mg/dL    Creatinine 0.89 0.70 - 1.30 mg/dL    Calcium 9.4  8.5 - 10.5 mg/dL    Glucose 122 70 - 125 mg/dL    GFR Estimate 87 >60 mL/min/1.73m2

## 2022-06-04 LAB
BACTERIA UR CULT: NORMAL
HGB BLD-MCNC: 14.7 G/DL (ref 13.3–17.7)
HOLD SPECIMEN: NORMAL
HOLD SPECIMEN: NORMAL
INR PPP: 3.07 (ref 0.85–1.15)

## 2022-06-04 PROCEDURE — 85610 PROTHROMBIN TIME: CPT | Performed by: INTERNAL MEDICINE

## 2022-06-04 PROCEDURE — 250N000009 HC RX 250: Performed by: PHYSICIAN ASSISTANT

## 2022-06-04 PROCEDURE — 250N000013 HC RX MED GY IP 250 OP 250 PS 637: Performed by: INTERNAL MEDICINE

## 2022-06-04 PROCEDURE — 99233 SBSQ HOSP IP/OBS HIGH 50: CPT | Performed by: INTERNAL MEDICINE

## 2022-06-04 PROCEDURE — 36415 COLL VENOUS BLD VENIPUNCTURE: CPT | Performed by: INTERNAL MEDICINE

## 2022-06-04 PROCEDURE — 120N000001 HC R&B MED SURG/OB

## 2022-06-04 PROCEDURE — 85018 HEMOGLOBIN: CPT | Performed by: PHYSICIAN ASSISTANT

## 2022-06-04 RX ORDER — WARFARIN SODIUM 2 MG/1
2 TABLET ORAL
Status: COMPLETED | OUTPATIENT
Start: 2022-06-04 | End: 2022-06-04

## 2022-06-04 RX ORDER — ATROPA BELLADONNA AND OPIUM 16.2; 6 MG/1; MG/1
60 SUPPOSITORY RECTAL EVERY 8 HOURS PRN
Status: DISCONTINUED | OUTPATIENT
Start: 2022-06-04 | End: 2022-06-13 | Stop reason: HOSPADM

## 2022-06-04 RX ADMIN — LOSARTAN POTASSIUM 50 MG: 50 TABLET, FILM COATED ORAL at 08:07

## 2022-06-04 RX ADMIN — SENNOSIDES AND DOCUSATE SODIUM 1 TABLET: 8.6; 5 TABLET ORAL at 08:07

## 2022-06-04 RX ADMIN — METOPROLOL TARTRATE 50 MG: 25 TABLET, FILM COATED ORAL at 20:01

## 2022-06-04 RX ADMIN — WARFARIN SODIUM 2 MG: 2 TABLET ORAL at 18:26

## 2022-06-04 RX ADMIN — ATROPA BELLADONNA AND OPIUM 1 SUPPOSITORY: 16.2; 6 SUPPOSITORY RECTAL at 20:01

## 2022-06-04 RX ADMIN — ATROPA BELLADONNA AND OPIUM 1 SUPPOSITORY: 16.2; 6 SUPPOSITORY RECTAL at 10:46

## 2022-06-04 RX ADMIN — POLYETHYLENE GLYCOL 3350 17 G: 17 POWDER, FOR SOLUTION ORAL at 16:42

## 2022-06-04 RX ADMIN — METOPROLOL TARTRATE 50 MG: 25 TABLET, FILM COATED ORAL at 08:06

## 2022-06-04 RX ADMIN — LEVOTHYROXINE SODIUM 50 MCG: 0.03 TABLET ORAL at 06:01

## 2022-06-04 RX ADMIN — ATORVASTATIN CALCIUM 10 MG: 10 TABLET, FILM COATED ORAL at 20:01

## 2022-06-04 ASSESSMENT — ACTIVITIES OF DAILY LIVING (ADL)
ADLS_ACUITY_SCORE: 37
ADLS_ACUITY_SCORE: 37
ADLS_ACUITY_SCORE: 47
ADLS_ACUITY_SCORE: 37
ADLS_ACUITY_SCORE: 47
ADLS_ACUITY_SCORE: 37
ADLS_ACUITY_SCORE: 47
ADLS_ACUITY_SCORE: 37
ADLS_ACUITY_SCORE: 37
ADLS_ACUITY_SCORE: 47

## 2022-06-04 NOTE — PROGRESS NOTES
Hospitalist Progress Note  ADMIT DATE: 6/2/2022     FACILITY: [facility]    [facility], [phone]      Assessment/Plan    Javon Cota is a 81 year old male with history of known bladder cancer, prior bladder perforation while on CBI, history of mechanical mitral valve replacement on warfarin, chronic A. fib, prior stroke, hypothyroidism and heart failure with preserved EF presents with gross hematuria      6/4 :     Continues to have blood tinged urine but improving  Hb stable  Urology following  On CBI with manual irrigation prn  If hematuria recurs or hb drops - will need CBI  On coumadin for h/o MVR, INR therapeutic      Not medically ready for discharge at this time.        A/p :         Gross hematuria: In the setting of known transitional cell carcinoma.  Bleeding started around noon on 6/2/22.  Patient currently undergoing treatment with pembrolizumab  Hemoglobin noted to be 15 on admission  CT urogram shows transmural extension of multifocal bladder masses through the bladder wall, no evidence of ureteral obstruction  --Urology consulted: recommend starting CBI. Patient has history of bladder perforation with CBI in the past  -- Trend hemoglobin   -- Continue home warfarin given history of mechanical mitral valve  -- If hematuria does not resolve with CBI then will need to consider holding warfarin with heparin bridging since patient does have mechanical valve and prior stroke  -- patient is consented for blood  -Urology plan for US  -Keep npo for now      History of mechanical MVR, heart failure with preserved EF and history of chronic A. Fib  --Continue home metoprolol and warfarin  -- Hold home losartan in the setting of gross blood loss, can resume as BP allows pending trend and hemodynamics     HTN urgency  -resume metoprolol and cozaar  -hydralazine prn     Prior stroke: Continue home statin      History of hypothyroidism: Continue  home      Constipation: CT urogram notes moderate amount of stool throughout the colon  -- Laxatives ordered as needed     DVT PPX:  Continue warfarin for now      Patient speaks english ([place] But I had difficulty understanding because his voice is very soft with accent. Wife is bedside and helped to communicate.    Code status:  Full code confirmed on admission    Barriers to Discharge:  Hematuria, urology further workup    Anticipated discharge date/Disposition: 2-3 days        Objective    Vital signs in last 24 hours  Temp:  [97.4  F (36.3  C)-98  F (36.7  C)] 97.4  F (36.3  C)  Pulse:  [] 104  Resp:  [16-18] 16  BP: (130-156)/(60-82) 130/60  SpO2:  [98 %-99 %] 99 % [unfilled] O2 Device: None (Room air)    Weight:   [unfilled] Weight change:     Intake/Output last 3 shifts  I/O last 3 completed shifts:  In: 900 [P.O.:900]  Out: 4400 [Urine:4400]  Body mass index is 19.69 kg/m .    Physical Exam    Physical Exam  General appearance: not in acute distress  HEENT: PERRL, EOMI  Lungs: Clear breath sounds in bilateral lung fields  Cardiovascular: Regular rate and rhythm, normal S1-S2  Abdomen: Soft, non tender, no distension, normal bowel sound  Musculoskeletal: No joint swelling  Skin: No rash and no edema  Neurology: grossly intact.  CBI in place with hematuria.       Pertinent Labs   Lab Results: personally reviewed.    Latest Reference Range & Units 06/03/22 06:11   WBC 4.0 - 11.0 10e3/uL 8.8   Hemoglobin 13.3 - 17.7 g/dL 14.5   Hematocrit 40.0 - 53.0 % 43.5   Platelet Count 150 - 450 10e3/uL 178   RBC Count 4.40 - 5.90 10e6/uL 4.80   MCV 78 - 100 fL 91   MCH 26.5 - 33.0 pg 30.2   MCHC 31.5 - 36.5 g/dL 33.3   RDW 10.0 - 15.0 % 12.7   INR 0.85 - 1.15  2.59 (H)   (H): Data is abnormally high    Medications  Scheduled Meds:    atorvastatin  10 mg Oral At Bedtime     levothyroxine  50 mcg Oral QAM AC     losartan  50 mg Oral Daily     metoprolol tartrate  50 mg Oral BID      senna-docusate  1 tablet Oral Daily     warfarin ANTICOAGULANT  2 mg Oral ONCE at 18:00     Continuous Infusions:    - MEDICATION INSTRUCTIONS -       Warfarin Therapy Reminder       PRN Meds:.acetaminophen **OR** acetaminophen, calcium carbonate, melatonin, ondansetron **OR** ondansetron, opium-belladonna, oxyCODONE, - MEDICATION INSTRUCTIONS -, polyethylene glycol, Warfarin Therapy Reminder    Pertinent Radiology   Radiology Resultspending    Advanced Care Planning:  Discharge planning discussed with patient and wife

## 2022-06-04 NOTE — PLAN OF CARE
Problem: Plan of Care - These are the overarching goals to be used throughout the patient stay.    Goal: Absence of Hospital-Acquired Illness or Injury  Intervention: Prevent Skin Injury  Recent Flowsheet Documentation  Taken 6/4/2022 1120 by Juan Mccoy RN  Body Position: legs elevated  Taken 6/4/2022 0900 by Juan Mccoy RN  Body Position: position changed independently  Taken 6/4/2022 0805 by Juan Mccoy RN  Body Position: position changed independently     Problem: Pain Acute  Goal: Acceptable Pain Control and Functional Ability  Outcome: Ongoing, Progressing  Intervention: Develop Pain Management Plan  Recent Flowsheet Documentation  Taken 6/4/2022 0805 by Juan Mccoy RN  Pain Management Interventions:   emotional support   repositioned  Intervention: Prevent or Manage Pain  Recent Flowsheet Documentation  Taken 6/4/2022 0900 by Juan Mccoy RN  Medication Review/Management: medications reviewed   Goal Outcome Evaluation:    Patient legs elevated with pillows and encouraged turning/repositioning. PRN medication opium-belladonna suppository given per MAR for pain.

## 2022-06-04 NOTE — UTILIZATION REVIEW
Admission Status; Secondary Review Determination   Under the authority of the Utilization Management Committee, the utilization review process indicated a secondary review on Javon Cota. The review outcome is based on review of the medical records, discussions with staff, and applying clinical experience noted on the date of the review.   (x) Inpatient Status Appropriate - This patient's medical care is consistent with medical management for inpatient care and reasonable inpatient medical practice.     RATIONALE FOR DETERMINATION   81 yr old male with known bladder cancer and prior bladder perforation, hx mechanical mitral valve replacement on warfarin, chronic Afib, prior CVA, hypothyroidism and HFpEF who presented on 6/2/22 with gross hematuria.  He is currently with known transitional cell carcinoma and on pembrolizumab.  Sudden onset bleeding noon 6/2.  INR 2.63.  Needs warfarin/anticoagulation given mechanical heart valve.  As of 6/4/22 he is still on CBI.  He is still requiring intermittent bladder irrigation in addition.  Urology is following and monitoring to see if needs cysto/fulguration. No obvious infection noted at time of presentation on UA (prior to CBI).  Given ongoing CBI he is not able to discharge at this time and has crossed 2 MN already.    At the time of admission with the information available to the attending physician more than 2 nights Hospital complex care was anticipated, based on patient risk of adverse outcome if treated as outpatient and complex care required. Inpatient admission is appropriate based on the Medicare guidelines.   The information on this document is developed by the utilization review team in order for the business office to ensure compliance. This only denotes the appropriateness of proper admission status and does not reflect the quality of care rendered.   The definitions of Inpatient Status and Observation Status used in making the determination above are those  provided in the CMS Coverage Manual, Chapter 1 and Chapter 6, section 70.4.   Sincerely,   Raquel Garcia MD  Utilization Review  Physician Advisor  [facility]

## 2022-06-04 NOTE — PROGRESS NOTES
Place of Service:  [facility]     Reason for follow up: Gross hematuria    SUBJECTIVE:  Events: no acute events overnight    Patient reports some discomfort with manual irrigation of his catheter. Bladder pain lasts about 2 minutes and resolves completely. Good output via catheter, no concern for retention per RN. Reports of watermelon-tinged urine overnight, minimal clot. Patient denies pain otherwise, no F/C/N/V.     OBJECTIVE:  PHYSICAL EXAM:  Temp: 98  F (36.7  C) Temp src: Oral BP: 136/67 Pulse: 96   Resp: 16 SpO2: 98 % O2 Device: None (Room air)    General: NAD, alert, cooperative  Head: normocephalic, without abnormality / atraumatic  Abdomen: soft, non tender, non distended. No suprapubic fullness, no suprapubic tenderness. No CVA tenderness,   Genitourinary: CBI running at a moderate rate, urine is clear without clot. Hand irrigation performed with no return of clot. Good output following. Patient tolerated fairly well. CBI resumed at low rate.   Skin: No rashes or lesions  Musculoskeletal: moves all four extremities equally; no calf edema or tenderness  Psychological: alert and oriented, answers questions appropriately    LABS:  Creatinine   Date Value Ref Range Status   06/02/2022 0.89 0.70 - 1.30 mg/dL Final     WBC Count   Date Value Ref Range Status   06/03/2022 8.8 4.0 - 11.0 10e3/uL Final     Hemoglobin   Date Value Ref Range Status   06/03/2022 14.5 13.3 - 17.7 g/dL Final   ]  Platelet Count   Date Value Ref Range Status   06/03/2022 178 150 - 450 10e3/uL Final       UA:  UA RESULTS:  Recent Labs   Lab Test 06/02/22  1635 08/02/19  1709   COLOR Red* Yellow   APPEARANCE Cloudy* Cloudy*   URINEGLC Negative Negative   URINEBILI Negative Negative   URINEKETONE Negative Negative   SG 1.009 1.018   UBLD >1.0 mg/dL* Large*   URINEPH 6.5 7.5   PROTEIN 100 * Trace*   UROBILINOGEN  --  8.0 E.U./dL*   NITRITE Negative Negative   LEUKEST 75 Denis/uL* Large*   RBCU >182* *   WBCU 30* >100*          Cultures:  Urine culture: pending    Lab Results: personally reviewed.     ASSESSMENT/PLAN:  Javon Cota is being seen by [facility] for gross hematuria. Patient has a history of known recurrent bladder cancer, undergoing treatment with pembrolizumab for known transitional cell carcinoma. Patient followed by [facility].     - Bladder US yesterday (6/3) showed a decompressed bladder, no hematoma/large clot. CBI running on moderate flow overnight with good output, no concern for clot retention.   - Hand irrigation performed this morning, minimal clot returned and urine is clear at this time. CBI weaned to low, will recheck later to see if we can trial CBI clamping.   - Repeat hemoglobin pending, patient has been hemodynamically stable and no gross hematuria.   - Continue to manually irrigate pepper catheter with 30 ml saline and aspirate (may repeat up to 6 times) scheduled q4 hours AND sooner if needed for deep red urine or large clots. This is in addition to CBI.  - Patient endorsing some discomfort with hand irrigation overnight, resolves quickly. B & O suppository ordered for suspected bladder spasms. Pt endorses history of bladder spasm.  - Patient has a history of bladder perforation on CBI after bladder procedure, no signs of perforation at this time. Continue to monitor closely for signs of perforation, including abdominal distention, persistent abdominal pain, bloating etc.   - If hematuria returns despite CBI, Hg begins to trend down or patient become hymodynamically unstable he may benefit from cystoscopy, fulguration and possible clot evacuation. No indication for urologic procedure at this time.  - Urology will continue to follow.       Linda Cedillo PA-C  [facility]   [phone]

## 2022-06-04 NOTE — PLAN OF CARE
"Pt c/o \"mild\" abdominal and pelvic pain at rest. Pain becomes \"moderate to severe\" when catheter tubing is moved or pt moves. CBI is running at a moderate rate. Output noted to be a watermelon color. One small clot noted in the tubing. No clots noted during irrigation/aspiration. However, during irrigation/aspiration, leakage was noted coming from pepper insertion site and pt appeared to be in severe pain. Tubing was slow to start flowing in the tubing when reconnected to the pepper bag, but did start flowing adequately. On call urologist notified of this. Writer advised it may be due to bladder spasms and to keep orders as is at this time.  will see the pt in the morning. Will continue to monitor.  "

## 2022-06-04 NOTE — PLAN OF CARE
Goal Outcome Evaluation:    Problem: Pain Acute  Goal: Acceptable Pain Control and Functional Ability  Outcome: Ongoing, Progressing    Problem: Hypertension Comorbidity  Goal: Blood Pressure in Desired Range  Outcome: Ongoing, Progressing    Pt denied pain throughout shift. VSS. CBI running at moderate rate. Watermelon colored output. Minimal small clots noted. Manually irrigated x2.     Emperatriz Estrada RN

## 2022-06-05 LAB
CREAT SERPL-MCNC: 0.83 MG/DL (ref 0.7–1.3)
GFR SERPL CREATININE-BSD FRML MDRD: 89 ML/MIN/1.73M2
HGB BLD-MCNC: 15 G/DL (ref 13.3–17.7)
INR PPP: 3.34 (ref 0.85–1.15)

## 2022-06-05 PROCEDURE — 250N000009 HC RX 250: Performed by: PHYSICIAN ASSISTANT

## 2022-06-05 PROCEDURE — 250N000013 HC RX MED GY IP 250 OP 250 PS 637: Performed by: INTERNAL MEDICINE

## 2022-06-05 PROCEDURE — 85018 HEMOGLOBIN: CPT | Performed by: INTERNAL MEDICINE

## 2022-06-05 PROCEDURE — 82565 ASSAY OF CREATININE: CPT | Performed by: INTERNAL MEDICINE

## 2022-06-05 PROCEDURE — 99233 SBSQ HOSP IP/OBS HIGH 50: CPT | Performed by: INTERNAL MEDICINE

## 2022-06-05 PROCEDURE — 120N000001 HC R&B MED SURG/OB

## 2022-06-05 PROCEDURE — 85610 PROTHROMBIN TIME: CPT | Performed by: INTERNAL MEDICINE

## 2022-06-05 PROCEDURE — 36415 COLL VENOUS BLD VENIPUNCTURE: CPT | Performed by: INTERNAL MEDICINE

## 2022-06-05 RX ORDER — WARFARIN SODIUM 1 MG/1
1 TABLET ORAL
Status: COMPLETED | OUTPATIENT
Start: 2022-06-05 | End: 2022-06-05

## 2022-06-05 RX ORDER — BISACODYL 10 MG
10 SUPPOSITORY, RECTAL RECTAL DAILY PRN
Status: DISCONTINUED | OUTPATIENT
Start: 2022-06-05 | End: 2022-06-13 | Stop reason: HOSPADM

## 2022-06-05 RX ADMIN — METOPROLOL TARTRATE 50 MG: 25 TABLET, FILM COATED ORAL at 07:38

## 2022-06-05 RX ADMIN — WARFARIN SODIUM 1 MG: 1 TABLET ORAL at 17:41

## 2022-06-05 RX ADMIN — BISACODYL 10 MG: 10 SUPPOSITORY RECTAL at 17:43

## 2022-06-05 RX ADMIN — ATROPA BELLADONNA AND OPIUM 1 SUPPOSITORY: 16.2; 6 SUPPOSITORY RECTAL at 14:28

## 2022-06-05 RX ADMIN — SENNOSIDES AND DOCUSATE SODIUM 1 TABLET: 8.6; 5 TABLET ORAL at 07:38

## 2022-06-05 RX ADMIN — ATORVASTATIN CALCIUM 10 MG: 10 TABLET, FILM COATED ORAL at 19:47

## 2022-06-05 RX ADMIN — LEVOTHYROXINE SODIUM 50 MCG: 0.03 TABLET ORAL at 06:02

## 2022-06-05 RX ADMIN — LOSARTAN POTASSIUM 50 MG: 50 TABLET, FILM COATED ORAL at 07:38

## 2022-06-05 RX ADMIN — METOPROLOL TARTRATE 50 MG: 25 TABLET, FILM COATED ORAL at 19:47

## 2022-06-05 RX ADMIN — POLYETHYLENE GLYCOL 3350 17 G: 17 POWDER, FOR SOLUTION ORAL at 07:39

## 2022-06-05 ASSESSMENT — ACTIVITIES OF DAILY LIVING (ADL)
ADLS_ACUITY_SCORE: 47
ADLS_ACUITY_SCORE: 43
ADLS_ACUITY_SCORE: 47
ADLS_ACUITY_SCORE: 43
ADLS_ACUITY_SCORE: 43
ADLS_ACUITY_SCORE: 47
ADLS_ACUITY_SCORE: 43
ADLS_ACUITY_SCORE: 47
ADLS_ACUITY_SCORE: 47
ADLS_ACUITY_SCORE: 43

## 2022-06-05 NOTE — PLAN OF CARE
Problem: Plan of Care - These are the overarching goals to be used throughout the patient stay.    Goal: Patient-Specific Goal (Individualized)   Goal Outcome Evaluation:      CBI flowing without problem, at moderate rate, urine light pink/watermelon.    Candice Wilson RN

## 2022-06-05 NOTE — PROGRESS NOTES
Hospitalist Progress Note  ADMIT DATE: 6/2/2022     FACILITY: [facility]    PCP: Fatou Olvera [place], [phone]      Assessment/Plan    Javon Cota is a 81 year old male with history of known bladder cancer, prior bladder perforation while on CBI, history of mechanical mitral valve replacement on warfarin, chronic A. fib, prior stroke, hypothyroidism and heart failure with preserved EF presents with gross hematuria      6/5 :     CBI running overnight on low flow without issues, no clots irrigated or concerns for clot retention.    Urine is clear this morning,  clamped CBI to assess for recurrence of hematuria.  Continues to have blood tinged urine but improving  Hb stable  Urology following  If hematuria recurs or hb drops - will need CBI  On coumadin for h/o MVR, INR therapeutic      Not medically ready for discharge at this time.        A/p :         Gross hematuria: In the setting of known transitional cell carcinoma.  Bleeding started around noon on 6/2/22.  Patient currently undergoing treatment with pembrolizumab  Hemoglobin noted to be 15 on admission  CT urogram shows transmural extension of multifocal bladder masses through the bladder wall, no evidence of ureteral obstruction  --Urology consulted: recommend starting CBI. Patient has history of bladder perforation with CBI in the past  -- Trend hemoglobin   -- Continue home warfarin given history of mechanical mitral valve  -- If hematuria does not resolve with CBI then will need to consider holding warfarin with heparin bridging since patient does have mechanical valve and prior stroke  -- patient is consented for blood  -Urology plan for US  -Keep npo for now      History of mechanical MVR, heart failure with preserved EF and history of chronic A. Fib  --Continue home metoprolol and warfarin  -- Hold home losartan in the setting of gross blood loss, can resume as BP allows pending trend and hemodynamics     HTN  urgency  -resume metoprolol and cozaar  -hydralazine prn     Prior stroke: Continue home statin      History of hypothyroidism: Continue home      Constipation: CT urogram notes moderate amount of stool throughout the colon  -- Laxatives ordered as needed     DVT PPX:  Continue warfarin for now      Patient speaks english ([place]). But I had difficulty understanding because his voice is very soft with accent. Wife is bedside and helped to communicate.    Code status:  Full code confirmed on admission    Barriers to Discharge:  Hematuria, urology further workup    Anticipated discharge date/Disposition: 2-3 days        Objective    Vital signs in last 24 hours  Temp:  [97.4  F (36.3  C)-97.9  F (36.6  C)] 97.4  F (36.3  C)  Pulse:  [70-93] 75  Resp:  [12-18] 12  BP: (124-135)/(68-80) 125/68  SpO2:  [96 %-98 %] 97 % [unfilled] O2 Device: None (Room air)    Weight:   [unfilled] Weight change:     Intake/Output last 3 shifts  I/O last 3 completed shifts:  In: 640 [P.O.:640]  Out: 450 [Urine:450]  Body mass index is 19.69 kg/m .    Physical Exam    Physical Exam  General appearance: not in acute distress  HEENT: PERRL, EOMI  Lungs: Clear breath sounds in bilateral lung fields  Cardiovascular: Regular rate and rhythm, normal S1-S2  Abdomen: Soft, non tender, no distension, normal bowel sound  Musculoskeletal: No joint swelling  Skin: No rash and no edema  Neurology: grossly intact.  CBI in place with hematuria.       Pertinent Labs   Lab Results: personally reviewed.    Latest Reference Range & Units 06/03/22 06:11   WBC 4.0 - 11.0 10e3/uL 8.8   Hemoglobin 13.3 - 17.7 g/dL 14.5   Hematocrit 40.0 - 53.0 % 43.5   Platelet Count 150 - 450 10e3/uL 178   RBC Count 4.40 - 5.90 10e6/uL 4.80   MCV 78 - 100 fL 91   MCH 26.5 - 33.0 pg 30.2   MCHC 31.5 - 36.5 g/dL 33.3   RDW 10.0 - 15.0 % 12.7   INR 0.85 - 1.15  2.59 (H)   (H): Data is abnormally high    Medications  Scheduled Meds:    atorvastatin  10  mg Oral At Bedtime     levothyroxine  50 mcg Oral QAM AC     losartan  50 mg Oral Daily     metoprolol tartrate  50 mg Oral BID     senna-docusate  1 tablet Oral Daily     warfarin ANTICOAGULANT  1 mg Oral ONCE at 18:00     Continuous Infusions:    - MEDICATION INSTRUCTIONS -       Warfarin Therapy Reminder       PRN Meds:.acetaminophen **OR** acetaminophen, bisacodyl, calcium carbonate, melatonin, ondansetron **OR** ondansetron, opium-belladonna, oxyCODONE, - MEDICATION INSTRUCTIONS -, polyethylene glycol, Warfarin Therapy Reminder    Pertinent Radiology   Radiology Resultspending    Advanced Care Planning:  Discharge planning discussed with patient and wife

## 2022-06-05 NOTE — PLAN OF CARE
Problem: Plan of Care - These are the overarching goals to be used throughout the patient stay.    Goal: Absence of Hospital-Acquired Illness or Injury  Intervention: Prevent Skin Injury  Recent Flowsheet Documentation  Taken 6/5/2022 0800 by Juan Mccoy RN  Body Position:    position changed independently    weight shifting  Taken 6/5/2022 0733 by Juan Mccoy RN  Body Position:    position changed independently    weight shifting     Problem: Plan of Care - These are the overarching goals to be used throughout the patient stay.    Goal: Optimal Comfort and Wellbeing  Intervention: Monitor Pain and Promote Comfort  Recent Flowsheet Documentation  Taken 6/5/2022 0733 by Juan Mccoy RN  Pain Management Interventions:    medication offered but refused    emotional support   Goal Outcome Evaluation:      Encouraged patient to shift weight and reposition. PRN medication opium-belladonna suppository given per MAR for pain.

## 2022-06-05 NOTE — PROVIDER NOTIFICATION
Notified MD at 10:00 AM regarding :  KB- Last BM was on 6/2, patient is constipated. Given PRN Miralax but didn't work. Patient would like a suppository, could this be ordered? Thanks .        Repage @ 2:58 pm

## 2022-06-05 NOTE — PLAN OF CARE
Problem: Risk for Delirium  Goal: Improved Sleep  Outcome: Ongoing, Progressing   Goal Outcome Evaluation:    Pt continues with continuous bladder irrigation at moderate rate. Output is pink-red. No clots. Irrigated with normal saline 30 ml every 4 hours. No resistance met and pt tolerated irrigation well. Pt's wife is at bedside. Participates in cares. Pt  would like to have prn Miralax sometimes this morning.

## 2022-06-05 NOTE — PLAN OF CARE
Problem: Plan of Care - These are the overarching goals to be used throughout the patient stay.    Goal: Optimal Comfort and Wellbeing  Outcome: Ongoing, Progressing  Intervention: Monitor Pain and Promote Comfort  Recent Flowsheet Documentation  Taken 6/4/2022 2012 by Radha Willard RN  Pain Management Interventions: medication (see MAR)   Goal Outcome Evaluation:    Plan of Care Reviewed With: patient, spouse     Overall Patient Progress: no change       Patient reporting abdominal pain. B&O suppository given before bladder irrigation. Pt still experienced discomfort. Irrigated slowly and met resistance. Draining light red urine. Roller clamp set at medium flow per MD instructions.

## 2022-06-05 NOTE — PROGRESS NOTES
Place of Service:  [facility]     Reason for follow up: Gross hematuria    SUBJECTIVE:  Events: no acute events overnight    Patient reports feeling well this morning. Denies abdominal or flank pain, no f/c/n/v. CBI running on low flow overnight with light pink-tinged urine, no clots evacuated with nursing and irrigation.  Patient tolerated irrigation well.  Vitals have been stable.    OBJECTIVE:  PHYSICAL EXAM:  Temp: 97.8  F (36.6  C) Temp src: Oral BP: 135/80 Pulse: 93   Resp: 16 SpO2: 96 % O2 Device: None (Room air)    General: NAD, alert, cooperative  Head: normocephalic, without abnormality / atraumatic  Abdomen: soft, non-tender, non-distended.  No suprapubic fullness, no suprapubic tenderness.  No CVA tenderness,   Genitourinary: Jacques catheter in place, urine clear translucent in catheter tubing, no clots.  CBI clamped.   Skin: No rashes or lesions  Musculoskeletal: moves all four extremities equally; no calf edema or tenderness  Psychological: alert and oriented, answers questions appropriately    LABS:  Creatinine   Date Value Ref Range Status   06/05/2022 0.83 0.70 - 1.30 mg/dL Final     WBC Count   Date Value Ref Range Status   06/03/2022 8.8 4.0 - 11.0 10e3/uL Final     Hemoglobin   Date Value Ref Range Status   06/05/2022 15.0 13.3 - 17.7 g/dL Final   ]  Platelet Count   Date Value Ref Range Status   06/03/2022 178 150 - 450 10e3/uL Final       UA:  UA RESULTS:  Recent Labs   Lab Test 06/02/22  1635 08/02/19  1709   COLOR Red* Yellow   APPEARANCE Cloudy* Cloudy*   URINEGLC Negative Negative   URINEBILI Negative Negative   URINEKETONE Negative Negative   SG 1.009 1.018   UBLD >1.0 mg/dL* Large*   URINEPH 6.5 7.5   PROTEIN 100 * Trace*   UROBILINOGEN  --  8.0 E.U./dL*   NITRITE Negative Negative   LEUKEST 75 Denis/uL* Large*   RBCU >182* *   WBCU 30* >100*         Cultures:  Urine culture:    Collected 6/2/2022  4:35 PM     Status: Final result     Visible to patient: No (inaccessible in  [employer])    Specimen Information: Urine, Midstream         0 Result Notes    Culture 10,000-50,000 CFU/mL Mixture of urogenital iain            Resulting Agency: [facility]           Specimen Collected: 06/02/22  4:35 PM Last Resulted: 06/04/22 10:54 AM               Lab Results: personally reviewed.     ASSESSMENT/PLAN:  Javon Cota is being seen by [facility] for gross hematuria. Patient has a history of known recurrent bladder cancer, undergoing treatment with pembrolizumab for known transitional cell carcinoma. [facility]     -CBI running overnight on low flow without issues, no clots irrigated or concerns for clot retention.  Urine is clear this morning, I did clamp CBI to assess for recurrence of hematuria.  -Hemoglobin stable at 15.  Vitals have been normal.  If gross hematuria returns and difficulties with weaning CBI, would consider holding warfarin with heparin bridging if medically able.  Okay to continue warfarin at this time from urologic standpoint.  -Continue to manually irrigate Jacques catheter with 30 ml saline and aspirate (may repeat up to 6 times) scheduled q4 hours AND sooner if needed for deep red urine or large clots.  -Bladder spasms improved with B&O suppository.  Continue q8 hours as needed.  -If hematuria returns and does not improve with resumed CBI, Hg begins to trend down or patient become hymodynamically unstable he may benefit from cystoscopy, fulguration and possible clot evacuation. No indication for urologic procedure at this time.  -Patient will need to follow-up with [facility] urology for complete hematuria work-up (cystoscopy, cytology) following discharge.  -Urology will continue to follow.        Linda Cedillo PA-C  [facility]   [phone]       Addendum 2:21 PM    Spoke with the RN, patient's urine is light cherry colored but translucent without clot. Good UOP, no sign of retention. CBI restarted at moderate flow. Will  attempt to wean tomorrow. No sign of hemodynamic instability. Continue with manual irrigation of pepper q4h and sooner if needed for deep red urine, large clots, decreased output. Please call [facility] with any questions/concerns.

## 2022-06-06 LAB
CREAT SERPL-MCNC: 0.76 MG/DL (ref 0.7–1.3)
GFR SERPL CREATININE-BSD FRML MDRD: >90 ML/MIN/1.73M2
HGB BLD-MCNC: 14.7 G/DL (ref 13.3–17.7)
INR PPP: 3.31 (ref 0.85–1.15)

## 2022-06-06 PROCEDURE — 120N000001 HC R&B MED SURG/OB

## 2022-06-06 PROCEDURE — 250N000013 HC RX MED GY IP 250 OP 250 PS 637: Performed by: INTERNAL MEDICINE

## 2022-06-06 PROCEDURE — 99233 SBSQ HOSP IP/OBS HIGH 50: CPT | Performed by: INTERNAL MEDICINE

## 2022-06-06 PROCEDURE — 85610 PROTHROMBIN TIME: CPT | Performed by: INTERNAL MEDICINE

## 2022-06-06 PROCEDURE — 85018 HEMOGLOBIN: CPT | Performed by: INTERNAL MEDICINE

## 2022-06-06 PROCEDURE — 82565 ASSAY OF CREATININE: CPT | Performed by: INTERNAL MEDICINE

## 2022-06-06 PROCEDURE — 36415 COLL VENOUS BLD VENIPUNCTURE: CPT | Performed by: INTERNAL MEDICINE

## 2022-06-06 RX ADMIN — LEVOTHYROXINE SODIUM 50 MCG: 0.03 TABLET ORAL at 06:06

## 2022-06-06 RX ADMIN — METOPROLOL TARTRATE 50 MG: 25 TABLET, FILM COATED ORAL at 08:58

## 2022-06-06 RX ADMIN — LOSARTAN POTASSIUM 50 MG: 50 TABLET, FILM COATED ORAL at 08:56

## 2022-06-06 RX ADMIN — SENNOSIDES AND DOCUSATE SODIUM 1 TABLET: 8.6; 5 TABLET ORAL at 08:55

## 2022-06-06 RX ADMIN — ATORVASTATIN CALCIUM 10 MG: 10 TABLET, FILM COATED ORAL at 19:47

## 2022-06-06 RX ADMIN — METOPROLOL TARTRATE 50 MG: 25 TABLET, FILM COATED ORAL at 19:47

## 2022-06-06 RX ADMIN — WARFARIN SODIUM 1.5 MG: 1 TABLET ORAL at 18:01

## 2022-06-06 ASSESSMENT — ACTIVITIES OF DAILY LIVING (ADL)
ADLS_ACUITY_SCORE: 45
ADLS_ACUITY_SCORE: 45
ADLS_ACUITY_SCORE: 43
ADLS_ACUITY_SCORE: 45
ADLS_ACUITY_SCORE: 45
ADLS_ACUITY_SCORE: 43
ADLS_ACUITY_SCORE: 45
ADLS_ACUITY_SCORE: 49
ADLS_ACUITY_SCORE: 45
ADLS_ACUITY_SCORE: 43

## 2022-06-06 NOTE — PLAN OF CARE
Problem: Pain Acute  Goal: Acceptable Pain Control and Functional Ability  Intervention: Prevent or Manage Pain  Recent Flowsheet Documentation  Taken 6/5/2022 1700 by Airam Sharma, RN  Medication Review/Management: medications reviewed  Pt denies pain this evening. CBI patent and intact. Cherry pink in color. Rate decreased slightly.  Irrigated x2.    Gave suppository dulcolax for constipation, good result. Had 2 bm.

## 2022-06-06 NOTE — PROGRESS NOTES
[facility]  Progress Note  06/06/22    Urine remains translucent light pink with CBI running at slow rate. Pt has been up to chair today, no worsening of color. CBI turned off at 1550. Manually irrigate and resume CBI at moderate rate if urine becomes cherry/clots/increased bladder pressure/catheter obstruction (order placed).     Bryon Palacios, APRN, CNP

## 2022-06-06 NOTE — PROGRESS NOTES
Hospitalist Progress Note  ADMIT DATE: 6/2/2022     FACILITY: [facility]    PCP: Fatou [place], [phone]      Assessment/Plan    Javon Cota is a 81 year old male with history of known bladder cancer, prior bladder perforation while on CBI, history of mechanical mitral valve replacement on warfarin, chronic A. fib, prior stroke, hypothyroidism and heart failure with preserved EF presents with gross hematuria      6/6 :     CBI running overnight on low flow without issues, no clots irrigated or concerns for clot retention.   Weaning off  CBI, manual bladder irrigation prn   Urine pink tinged, hematuria improving  Hb stable  Urology following  If hematuria recurs or hb drops - will need CBI  On coumadin for h/o MVR, INR therapeutic    Possible discharge in am        A/p :         Gross hematuria: In the setting of known transitional cell carcinoma.  Bleeding started around noon on 6/2/22.  Patient currently undergoing treatment with pembrolizumab  Hemoglobin noted to be 15 on admission  CT urogram shows transmural extension of multifocal bladder masses through the bladder wall, no evidence of ureteral obstruction  --Urology consulted: recommend starting CBI. Patient has history of bladder perforation with CBI in the past  -- Trend hemoglobin   -- Continue home warfarin given history of mechanical mitral valve  -- If hematuria does not resolve with CBI then will need to consider holding warfarin with heparin bridging since patient does have mechanical valve and prior stroke  -- patient is consented for blood  -Urology plan for US  -Keep npo for now      History of mechanical MVR, heart failure with preserved EF and history of chronic A. Fib  --Continue home metoprolol and warfarin  -- Hold home losartan in the setting of gross blood loss, can resume as BP allows pending trend and hemodynamics     HTN urgency  -resume metoprolol and cozaar  -hydralazine prn     Prior  stroke: Continue home statin      History of hypothyroidism: Continue home      Constipation: CT urogram notes moderate amount of stool throughout the colon  -- Laxatives ordered as needed     DVT PPX:  Continue warfarin for now      Patient speaks english (working as  [place]). But I had difficulty understanding because his voice is very soft with accent. Wife is bedside and helped to communicate.    Code status:  Full code confirmed on admission    Barriers to Discharge:  Hematuria, urology further workup    Anticipated discharge date/Disposition: 2-3 days        Objective    Vital signs in last 24 hours  Temp:  [96.8  F (36  C)-98.3  F (36.8  C)] 98.3  F (36.8  C)  Pulse:  [71-97] 97  Resp:  [14-19] 19  BP: (108-134)/(55-86) 128/74  SpO2:  [94 %-99 %] 94 % [unfilled] O2 Device: None (Room air)    Weight:   [unfilled] Weight change:     Intake/Output last 3 shifts  I/O last 3 completed shifts:  In: -   Out: 2850 [Urine:2850]  Body mass index is 19.69 kg/m .    Physical Exam    Physical Exam  General appearance: not in acute distress  HEENT: PERRL, EOMI  Lungs: Clear breath sounds in bilateral lung fields  Cardiovascular: Regular rate and rhythm, normal S1-S2  Abdomen: Soft, non tender, no distension, normal bowel sound  Musculoskeletal: No joint swelling  Skin: No rash and no edema  Neurology: grossly intact.  CBI in place with hematuria.       Pertinent Labs   Lab Results: personally reviewed.    Latest Reference Range & Units 06/03/22 06:11   WBC 4.0 - 11.0 10e3/uL 8.8   Hemoglobin 13.3 - 17.7 g/dL 14.5   Hematocrit 40.0 - 53.0 % 43.5   Platelet Count 150 - 450 10e3/uL 178   RBC Count 4.40 - 5.90 10e6/uL 4.80   MCV 78 - 100 fL 91   MCH 26.5 - 33.0 pg 30.2   MCHC 31.5 - 36.5 g/dL 33.3   RDW 10.0 - 15.0 % 12.7   INR 0.85 - 1.15  2.59 (H)   (H): Data is abnormally high    Medications  Scheduled Meds:    atorvastatin  10 mg Oral At Bedtime     levothyroxine  50 mcg Oral QAM AC     losartan  50 mg  Oral Daily     metoprolol tartrate  50 mg Oral BID     senna-docusate  1 tablet Oral Daily     warfarin ANTICOAGULANT  1.5 mg Oral ONCE at 18:00     Continuous Infusions:    - MEDICATION INSTRUCTIONS -       Warfarin Therapy Reminder       PRN Meds:.acetaminophen **OR** acetaminophen, bisacodyl, calcium carbonate, melatonin, ondansetron **OR** ondansetron, opium-belladonna, oxyCODONE, - MEDICATION INSTRUCTIONS -, polyethylene glycol, Warfarin Therapy Reminder    Pertinent Radiology   Radiology Resultspending    Advanced Care Planning:  Discharge planning discussed with patient and wife

## 2022-06-06 NOTE — PLAN OF CARE
Problem: Pain Acute  Goal: Acceptable Pain Control and Functional Ability  Intervention: Prevent or Manage Pain  Recent Flowsheet Documentation  Taken 6/6/2022 0000 by Ortiz Bonilla RN  Medication Review/Management: medications reviewed   Goal Outcome Evaluation:        Pt on continuous bladder irrigation at moderate rate. Output light pink, no clots. No complaints of pain or discomfort. Jacques manually irrigated with 30 ml every 4 hours. Wife is in the room, participates in cares. Per spouse, pt likes to sleep on his back, refused to be turned and repositioned, except during incontinence cares. Constipation resolved.

## 2022-06-06 NOTE — PROGRESS NOTES
Place of Service:  [facility]     Reason for follow up: gross hematuria, hx of recurrent bladder cancer    SUBJECTIVE:  Events: no acute events overnight. RNs have been manually irrigating pepper every 4 hours (no clots aspirated) and CBI at slow rate. Pt reports bladder discomfort with manual irrigations.     Family member present.    OBJECTIVE:  PHYSICAL EXAM:  Temp: 96.8  F (36  C) Temp src: Axillary BP: 127/86 Pulse: 84   Resp: 18 SpO2: 97 % O2 Device: None (Room air)    General: NAD, alert, cooperative  Head: normocephalic, without abnormality / atraumatic  Abdomen: soft, non- tender, non-distended. No suprapubic fullness, no suprapubic tenderness. No bilateral CVA tenderness.   Genitourinary: Penis and scrotum without erythema or edema. 20 Fr 3 way catheter with CBI infusing at slow rate, urine faint pink. Manually irrigated with return of more cherry colored urine without clots.   Skin: No rashes or lesions  Musculoskeletal: moves all four extremities equally.   Psychological: alert and oriented, answers questions appropriately.     LABS:  Creatinine   Date Value Ref Range Status   06/06/2022 0.76 0.70 - 1.30 mg/dL Final     WBC Count   Date Value Ref Range Status   06/03/2022 8.8 4.0 - 11.0 10e3/uL Final     Hemoglobin   Date Value Ref Range Status   06/06/2022 14.7 13.3 - 17.7 g/dL Final     Platelet Count   Date Value Ref Range Status   06/03/2022 178 150 - 450 10e3/uL Final     INR   Date Value Ref Range Status   06/06/2022 3.31 (H) 0.85 - 1.15 Final       UA:  UA RESULTS:  Recent Labs   Lab Test 06/02/22  1635 08/02/19  1709   COLOR Red* Yellow   APPEARANCE Cloudy* Cloudy*   URINEGLC Negative Negative   URINEBILI Negative Negative   URINEKETONE Negative Negative   SG 1.009 1.018   UBLD >1.0 mg/dL* Large*   URINEPH 6.5 7.5   PROTEIN 100 * Trace*   UROBILINOGEN  --  8.0 E.U./dL*   NITRITE Negative Negative   LEUKEST 75 Denis/uL* Large*   RBCU >182* *   WBCU 30* >100*       Cultures:  Urine  6/2/22: 10,000-50,000 CFU/mL Mixture of urogenital iain     Blood: none    Lab Results: personally reviewed.     ASSESSMENT/PLAN:  Javon Cota is being seen by [facility] for gross hematuria. History of known recurrent bladder cancer, undergoing treatment with pembrolizumab for known transitional cell carcinoma (pt reports has received 1 treatment of at least 3). Patient followed by [facility].     - Urine pink with CBI at slow rate. No clots aspirated with manual irrigation. No hematoma or significant debris on 6/3 bladder US. Pt c/o bladder pressure/pain with irrigations. Will change manual irrigations to prn and continue CBI at slow rate for now. Will re-assess this afternoon, hope to wean off CBI.   - History of bladder perforation on CBI after bladder procedure, no signs of perforation at this time. Continue to monitor closely for signs of perforation, including abdominal distention, persistent abdominal pain, bloating, decreased catheter drainage, etc.   - Ok for patient to get up to chair/ambulate as needed/if able.   - On coumadin for mechanical valve. INR 3.31.   - Hgb stable, 14.7 today (15.0 on 5/5). Monitor.       Bryon Palacios, APRN, CNP  [facility]   [phone]

## 2022-06-06 NOTE — PLAN OF CARE
Problem: Plan of Care - These are the overarching goals to be used throughout the patient stay.    Goal: Absence of Hospital-Acquired Illness or Injury  Intervention: Prevent and Manage VTE (Venous Thromboembolism) Risk  Recent Flowsheet Documentation  Taken 6/6/2022 1638 by Louise Nance, RN  Activity Management: activity adjusted per tolerance  Taken 6/6/2022 0730 by Louise Nance, RN  Activity Management: activity adjusted per tolerance   Goal Outcome Evaluation:      Patient up to the chair for meals.  Patient and wife where educated on the importance of mobility and the risk of VTE r/t immobilization.   Problem: Risk for Delirium  Goal: Optimal Coping  Outcome: Ongoing, Progressing        Patient up to chair for meals.  Patient blinds open during day hours.  Pateint discouraged to sleep during the day to promote sleep during the night.

## 2022-06-07 ENCOUNTER — APPOINTMENT (OUTPATIENT)
Dept: OCCUPATIONAL THERAPY | Facility: HOSPITAL | Age: 82
DRG: 686 | End: 2022-06-07
Attending: INTERNAL MEDICINE
Payer: COMMERCIAL

## 2022-06-07 ENCOUNTER — APPOINTMENT (OUTPATIENT)
Dept: PHYSICAL THERAPY | Facility: HOSPITAL | Age: 82
DRG: 686 | End: 2022-06-07
Attending: INTERNAL MEDICINE
Payer: COMMERCIAL

## 2022-06-07 LAB
HGB BLD-MCNC: 14.5 G/DL (ref 13.3–17.7)
HOLD SPECIMEN: NORMAL
INR PPP: 2.79 (ref 0.85–1.15)

## 2022-06-07 PROCEDURE — 97162 PT EVAL MOD COMPLEX 30 MIN: CPT | Mod: GP

## 2022-06-07 PROCEDURE — 120N000001 HC R&B MED SURG/OB

## 2022-06-07 PROCEDURE — 99232 SBSQ HOSP IP/OBS MODERATE 35: CPT | Performed by: INTERNAL MEDICINE

## 2022-06-07 PROCEDURE — 250N000013 HC RX MED GY IP 250 OP 250 PS 637: Performed by: INTERNAL MEDICINE

## 2022-06-07 PROCEDURE — 97116 GAIT TRAINING THERAPY: CPT | Mod: GP

## 2022-06-07 PROCEDURE — 36415 COLL VENOUS BLD VENIPUNCTURE: CPT | Performed by: INTERNAL MEDICINE

## 2022-06-07 PROCEDURE — 97166 OT EVAL MOD COMPLEX 45 MIN: CPT | Mod: GO

## 2022-06-07 PROCEDURE — 85018 HEMOGLOBIN: CPT | Performed by: INTERNAL MEDICINE

## 2022-06-07 PROCEDURE — 85610 PROTHROMBIN TIME: CPT | Performed by: INTERNAL MEDICINE

## 2022-06-07 PROCEDURE — 97535 SELF CARE MNGMENT TRAINING: CPT | Mod: GO

## 2022-06-07 RX ORDER — WARFARIN SODIUM 2 MG/1
2 TABLET ORAL
Status: COMPLETED | OUTPATIENT
Start: 2022-06-07 | End: 2022-06-07

## 2022-06-07 RX ADMIN — METOPROLOL TARTRATE 50 MG: 25 TABLET, FILM COATED ORAL at 19:49

## 2022-06-07 RX ADMIN — LOSARTAN POTASSIUM 50 MG: 50 TABLET, FILM COATED ORAL at 08:47

## 2022-06-07 RX ADMIN — SENNOSIDES AND DOCUSATE SODIUM 1 TABLET: 8.6; 5 TABLET ORAL at 08:47

## 2022-06-07 RX ADMIN — METOPROLOL TARTRATE 50 MG: 25 TABLET, FILM COATED ORAL at 08:47

## 2022-06-07 RX ADMIN — WARFARIN SODIUM 2 MG: 2 TABLET ORAL at 17:37

## 2022-06-07 RX ADMIN — ATORVASTATIN CALCIUM 10 MG: 10 TABLET, FILM COATED ORAL at 19:49

## 2022-06-07 RX ADMIN — LEVOTHYROXINE SODIUM 50 MCG: 0.03 TABLET ORAL at 06:11

## 2022-06-07 RX ADMIN — POLYETHYLENE GLYCOL 3350 17 G: 17 POWDER, FOR SOLUTION ORAL at 08:48

## 2022-06-07 ASSESSMENT — ACTIVITIES OF DAILY LIVING (ADL)
ADLS_ACUITY_SCORE: 49
ADLS_ACUITY_SCORE: 45
ADLS_ACUITY_SCORE: 45
ADLS_ACUITY_SCORE: 49
ADLS_ACUITY_SCORE: 45
ADLS_ACUITY_SCORE: 49

## 2022-06-07 NOTE — PROGRESS NOTES
06/07/22 1410   Quick Adds   Type of Visit Initial PT Evaluation   Living Environment   People in Home spouse   Current Living Arrangements house   Home Accessibility stairs to enter home;stairs within home   Number of Stairs, Main Entrance 3   Stair Railings, Main Entrance railings on both sides of stairs   Number of Stairs, Within Home, Primary other (see comments)  (stair lift)   Transportation Anticipated family or friend will provide;health plan transportation   Self-Care   Usual Activity Tolerance fair   Current Activity Tolerance poor   Equipment Currently Used at Home walker, rolling   General Information   Onset of Illness/Injury or Date of Surgery 06/02/22   Referring Physician Papito Arteaga MD   Patient/Family Therapy Goals Statement (PT) return home   Pertinent History of Current Problem (include personal factors and/or comorbidities that impact the POC) Hematuria   Existing Precautions/Restrictions fall   Strength (Manual Muscle Testing)   Strength (Manual Muscle Testing) Deficits observed during functional mobility   Bed Mobility   Bed Mobility sit-supine;scooting/bridging   Scooting/Bridging [place] (Bed Mobility) dependent (less than 25% patient effort)   Sit-Supine [place] (Bed Mobility) minimum assist (75% patient effort)   Transfers   Transfers sit-stand transfer;toilet transfer   Sit-Stand Transfer   Sit-Stand [place] (Transfers) contact guard   Assistive Device (Sit-Stand Transfers) walker, front-wheeled   Toilet Transfer   [place] Level (Toilet Transfer) contact guard   Assistive Device (Toilet Transfer) walker, front-wheeled   Type (Toilet Transfer) sit-stand;stand-sit   Gait/Stairs (Locomotion)   [place] Level (Gait) contact guard;minimum assist (75% patient effort)   Assistive Device (Gait) walker, front-wheeled   Distance in Feet (Required for LE Total Joints) 10', 60'   Pattern (Gait) step-through   Deviations/Abnormal Patterns (Gait)  festinating/shuffling;gait speed decreased   Negotiation (Stairs) stairs independence;handrail location;number of steps   [place] Level (Stairs) minimum assist (75% patient effort);moderate assist (50% patient effort);2 person assist   Handrail Location (Stairs) left side (ascending);right side (descending)   Number of Steps (Stairs) 3   Clinical Impression   Criteria for Skilled Therapeutic Intervention Yes, treatment indicated   PT Diagnosis (PT) Impaired functional mobility   Influenced by the following impairments weakness, fatigue   Functional limitations due to impairments impaired strength, impaired transfers, impaired gait   Clinical Presentation (PT Evaluation Complexity) Stable/Uncomplicated   Clinical Presentation Rationale Presents as diagnosed   Clinical Decision Making (Complexity) moderate complexity   Planned Therapy Interventions (PT) balance training;bed mobility training;gait training;home exercise program;stair training;strengthening;transfer training   Anticipated Equipment Needs at Discharge (PT) walker, rolling   Risk & Benefits of therapy have been explained patient;spouse/significant other   PT Discharge Planning   PT Discharge Recommendation (DC Rec) Transitional Care Facility;home with assist;home with home care physical therapy   PT Rationale for DC Rec Ax1-2 for mobility. Unable to climb 3 stairs without significant assist. Wife states she is unable to physically assist him.   Total Evaluation Time   Total Evaluation Time (Minutes) 10   Physical Therapy Goals   PT Frequency Daily   PT Predicted Duration/Target Date for Goal Attainment 06/14/22   PT Goals Bed Mobility;Transfers;Gait;Stairs   PT: Bed Mobility Supervision/stand-by assist;Supine to/from sit   PT: Transfers Supervision/stand-by assist;Sit to/from stand;Assistive device   PT: Gait Supervision/stand-by assist;Rolling walker;100 feet   PT: Stairs Minimal assist;3 stairs;Rail on both sides       Jessica Emery, PT,  DPT  6/7/2022

## 2022-06-07 NOTE — PLAN OF CARE
Goal Outcome Evaluation:    I cared for pt from 5998-1160, pt's urine was clear pink, no clots.  CBI not running, no manual irrigation needed.  Pain 1-2 but up to 6 with spasms.  Declined pain meds.  Probable discharge tomorrow.      Problem: Plan of Care - These are the overarching goals to be used throughout the patient stay.    Goal: Plan of Care Review/Shift Note  Description: The Plan of Care Review/Shift note should be completed every shift.  The Outcome Evaluation is a brief statement about your assessment that the patient is improving, declining, or no change.  This information will be displayed automatically on your shift note.  Outcome: Ongoing, Progressing     Problem: Plan of Care - These are the overarching goals to be used throughout the patient stay.    Goal: Readiness for Transition of Care  Outcome: Ongoing, Progressing     Problem: Hypertension Comorbidity  Goal: Blood Pressure in Desired Range  Outcome: Ongoing, Progressing     Problem: Pain Acute  Goal: Acceptable Pain Control and Functional Ability  Outcome: Ongoing, Progressing

## 2022-06-07 NOTE — PLAN OF CARE
Problem: Pain Acute  Goal: Acceptable Pain Control and Functional Ability  Outcome: Met  Intervention: Prevent or Manage Pain  Recent Flowsheet Documentation  Taken 6/7/2022 0000 by Ortiz Bonilla RN  Medication Review/Management: medications reviewed   Goal Outcome Evaluation:    Pt is no longer on continuous bladder irrigation, remains clamped. Urine output has light translucent pink color, no clots. Pt denies pain. No manual irrigation needed.

## 2022-06-07 NOTE — PROGRESS NOTES
Place of Service:  [facility]     Reason for follow up: gross hematuria, hx of recurrent bladder cancer    SUBJECTIVE:  Events: no acute events overnight.     CBI off since 6/6 afternoon, urine remains translucent watermelon color. Pt reports intermittent (3x/day) bladder spasms since pepper present. Pt denies abdominal and bilateral flank pain, fever, chills.     Spouse present. Reports patient had no difficulty urinating and no hx of retention prior to this hospitalization.     OBJECTIVE:  PHYSICAL EXAM:  Temp: 97.8  F (36.6  C) Temp src: Oral BP: (!) 140/80 Pulse: 105   Resp: 18 SpO2: 97 % O2 Device: None (Room air)    General: NAD, alert, cooperative  Head: normocephalic, without abnormality / atraumatic  Abdomen: soft, non- tender, non-distended. No suprapubic fullness, no suprapubic tenderness. No bilateral CVA tenderness.   Genitourinary: Penis and scrotum without erythema or edema. 20 Fr 3 way catheter draining translucent watermelon colored urine, 1 tiny stranding clot noted. Capped irrigation port.   Skin: No rashes or lesions  Musculoskeletal: moves all four extremities equally.   Psychological: alert and oriented, answers questions appropriately.     LABS:  Creatinine   Date Value Ref Range Status   06/06/2022 0.76 0.70 - 1.30 mg/dL Final     WBC Count   Date Value Ref Range Status   06/03/2022 8.8 4.0 - 11.0 10e3/uL Final     Hemoglobin   Date Value Ref Range Status   06/07/2022 14.5 13.3 - 17.7 g/dL Final     Platelet Count   Date Value Ref Range Status   06/03/2022 178 150 - 450 10e3/uL Final     INR   Date Value Ref Range Status   06/07/2022 2.79 (H) 0.85 - 1.15 Final     UA:  UA RESULTS:  Recent Labs   Lab Test 06/02/22  1635 08/02/19  1709   COLOR Red* Yellow   APPEARANCE Cloudy* Cloudy*   URINEGLC Negative Negative   URINEBILI Negative Negative   URINEKETONE Negative Negative   SG 1.009 1.018   UBLD >1.0 mg/dL* Large*   URINEPH 6.5 7.5   PROTEIN 100 * Trace*   UROBILINOGEN  --  8.0  E.U./dL*   NITRITE Negative Negative   LEUKEST 75 Denis/uL* Large*   RBCU >182* *   WBCU 30* >100*       Cultures:  Urine 6/2/22: 10,000-50,000 CFU/mL Mixture of urogenital iain     Blood: none    Lab Results: personally reviewed.     ASSESSMENT/PLAN:  Javon Cota is being seen by [facility] for gross hematuria. History of known recurrent bladder cancer, undergoing treatment with pembrolizumab for known transitional cell carcinoma (pt reports has received 1 treatment of at least 3). Patient followed by [facility].     - Urine translucent watermelon color with CBI off approx 19 hours. PO fluids encouraged.   - Pt denies difficulty voiding prior to admission, despite bladder CA and mildly enlarged prostate. Will remove pepper catheter and monitor PVRs.   - Ok for patient to get up to chair/ambulate as needed/if able.   - On coumadin for mechanical valve. INR 2.79.    - Hgb stable, 14.5 today (14.7 on 6/6). Monitor.   - Follow up with  Urology at discharge.       Bryon Palacios, APRN, CNP  [facility]   [phone]

## 2022-06-07 NOTE — PROGRESS NOTES
Hospitalist Progress Note  ADMIT DATE: 6/2/2022     FACILITY: [facility]    [facility] [phone]      Assessment/Plan    Javon Cota is a 81 year old male with history of known bladder cancer, prior bladder perforation while on CBI, history of mechanical mitral valve replacement on warfarin, chronic A. fib, prior stroke, hypothyroidism and heart failure with preserved EF presents with gross hematuria      6/7 :     Off CBI   Urine pink tinged, hematuria improving  Hb stable  Urology following  Monitoring with urinary retention today with bladder scans, pepper prn.  On coumadin for h/o MVR, INR therapeutic    Pt/ot team recommending placement  Discharge once placement found        A/p :           Gross hematuria: In the setting of known transitional cell carcinoma.  Bleeding started around noon on 6/2/22.  Patient currently undergoing treatment with pembrolizumab  Hemoglobin noted to be 15 on admission  CT urogram shows transmural extension of multifocal bladder masses through the bladder wall, no evidence of ureteral obstruction  --Urology consulted: recommend starting CBI. Patient has history of bladder perforation with CBI in the past  -- Trend hemoglobin   -- Continue home warfarin given history of mechanical mitral valve  -- If hematuria does not resolve with CBI then will need to consider holding warfarin with heparin bridging since patient does have mechanical valve and prior stroke  -- patient is consented for blood    6/7 :     Off CBI today   Urine pink tinged, hematuria improving  Hb stable  Urology following  Monitoring with urinary retention today with bladder scans, pepper prn.  If hematuria recurs or hb drops - will need CBI  On coumadin for h/o MVR, INR therapeutic    Discharge once placement found            History of mechanical MVR, heart failure with preserved EF and history of chronic A. Fib  --Continue home metoprolol and warfarin  -- Hold  home losartan in the setting of gross blood loss, can resume as BP allows pending trend and hemodynamics       HTN urgency  -resume metoprolol and cozaar  -hydralazine prn  Stable.       Prior stroke: Continue home statin        History of hypothyroidism: Continue home        Constipation: CT urogram notes moderate amount of stool throughout the colon  -- Laxatives ordered as needed     DVT PPX:  Continue warfarin for now      Patient speaks english (working as  [place]). But I had difficulty understanding because his voice is very soft with accent. Wife is bedside and helped to communicate.    Code status:  Full code confirmed on admission    Barriers to Discharge:  placement    Anticipated discharge date/Disposition:  Once placement found        Objective    Vital signs in last 24 hours  Temp:  [97.3  F (36.3  C)-97.8  F (36.6  C)] 97.4  F (36.3  C)  Pulse:  [] 82  Resp:  [18-20] 20  BP: (125-140)/(58-80) 138/60  SpO2:  [97 %-100 %] 100 % [unfilled] O2 Device: None (Room air)    Weight:   [unfilled] Weight change:     Intake/Output last 3 shifts  I/O last 3 completed shifts:  In: -   Out: 950 [Urine:950]  Body mass index is 19.69 kg/m .    Physical Exam    Physical Exam  General appearance: not in acute distress  HEENT: PERRL, EOMI  Lungs: Clear breath sounds in bilateral lung fields  Cardiovascular: Regular rate and rhythm, normal S1-S2  Abdomen: Soft, non tender, no distension, normal bowel sound  Musculoskeletal: No joint swelling  Skin: No rash and no edema  Neurology: grossly intact.  CBI in place with hematuria.       Pertinent Labs   Lab Results: personally reviewed.    Latest Reference Range & Units 06/03/22 06:11   WBC 4.0 - 11.0 10e3/uL 8.8   Hemoglobin 13.3 - 17.7 g/dL 14.5   Hematocrit 40.0 - 53.0 % 43.5   Platelet Count 150 - 450 10e3/uL 178   RBC Count 4.40 - 5.90 10e6/uL 4.80   MCV 78 - 100 fL 91   MCH 26.5 - 33.0 pg 30.2   MCHC 31.5 - 36.5 g/dL 33.3   RDW 10.0 - 15.0 % 12.7    INR 0.85 - 1.15  2.59 (H)   (H): Data is abnormally high    Medications  Scheduled Meds:    atorvastatin  10 mg Oral At Bedtime     levothyroxine  50 mcg Oral QAM AC     losartan  50 mg Oral Daily     metoprolol tartrate  50 mg Oral BID     senna-docusate  1 tablet Oral Daily     warfarin ANTICOAGULANT  2 mg Oral ONCE at 18:00     Continuous Infusions:    - MEDICATION INSTRUCTIONS -       Warfarin Therapy Reminder       PRN Meds:.acetaminophen **OR** acetaminophen, bisacodyl, calcium carbonate, melatonin, ondansetron **OR** ondansetron, opium-belladonna, oxyCODONE, - MEDICATION INSTRUCTIONS -, polyethylene glycol, Warfarin Therapy Reminder    Pertinent Radiology   Radiology Resultspending    Advanced Care Planning:  Discharge planning discussed with patient and wife

## 2022-06-07 NOTE — PROGRESS NOTES
06/07/22 1400   Quick Adds   Type of Visit Initial Occupational Therapy Evaluation   Living Environment   People in Home spouse   Current Living Arrangements house   Home Accessibility   (stair lift)   Self-Care   Usual Activity Tolerance fair   Current Activity Tolerance fair   Instrumental Activities of Daily Living (IADL)   IADL Comments uses walker, sit to groom at sink, has assist with dressing, toilets self   General Information   Onset of Illness/Injury or Date of Surgery 06/02/22   Referring Physician Papito Arteaga   General Observations and Info Pt moving very slow   Cognitive Status Examination   Orientation Status orientation to person, place and time   Range of Motion Comprehensive   General Range of Motion no range of motion deficits identified   Strength Comprehensive (MMT)   Comment, General Manual Muscle Testing (MMT) Assessment WFL   Bed Mobility   Bed Mobility sit-supine;supine-sit   Supine-Sit [place] (Bed Mobility) modified independence   Sit-Supine [place] (Bed Mobility) modified independence   Comment (Bed Mobility) max a of 2 to hob   Transfers   Transfers sit-stand transfer   Sit-Stand Transfer   Sit-Stand [place] (Transfers) contact guard   Assistive Device (Sit-Stand Transfers) walker, front-wheeled   Clinical Impression   Criteria for Skilled Therapeutic Interventions Met (OT) Yes, treatment indicated   OT Diagnosis Hematuria   OT Problem List-Impairments impacting ADL activity tolerance impaired;mobility;strength   Assessment of Occupational Performance 3-5 Performance Deficits   Planned Therapy Interventions (OT) ADL retraining;strengthening;transfer training   Clinical Decision Making Complexity (OT) low complexity   Risk & Benefits of therapy have been explained evaluation/treatment results reviewed   OT Discharge Planning   OT Discharge Recommendation (DC Rec) home with assist;Transitional Care Facility   OT Rationale for DC Rec Pending progress/pt not at baseline  today for adls and mobility   Total Evaluation Time (Minutes)   Total Evaluation Time (Minutes) 10   OT Goals   Therapy Frequency (OT) Daily   OT Predicted Duration/Target Date for Goal Attainment 06/14/22   OT Goals Hygiene/Grooming;Lower Body Dressing;Transfers   OT: Hygiene/Grooming modified independent  (seated)   OT: Lower Body Dressing Supervision/stand-by assist   OT: Transfer Supervision/stand-by assist

## 2022-06-07 NOTE — PLAN OF CARE
"  Problem: Plan of Care - These are the overarching goals to be used throughout the patient stay.    Goal: Patient-Specific Goal (Individualized)  Description: You can add care plan individualizations to a care plan. Examples of Individualization might be:  \"Parent requests to be called daily at 9am for status\", \"I have a hard time hearing out of my right ear\", or \"Do not touch me to wake me up as it startles me\".  6/7/2022 1856 by Yordan Chavez RN  Outcome: Ongoing, Progressing  6/7/2022 1828 by Yordan Chavez RN  Outcome: Ongoing, Progressing     Problem: Plan of Care - These are the overarching goals to be used throughout the patient stay.    Goal: Optimal Comfort and Wellbeing  6/7/2022 1856 by Yordan Chavez RN  Outcome: Ongoing, Progressing  6/7/2022 1828 by Yordan Chavez RN  Outcome: Ongoing, Progressing     Problem: Risk for Delirium  Goal: Optimal Coping  6/7/2022 1856 by Yordan Chavez RN  Outcome: Ongoing, Progressing  6/7/2022 1828 by Yordan Chavez RN  Outcome: Ongoing, Progressing     Problem: Risk for Delirium  Goal: Improved Attention and Thought Clarity  6/7/2022 1856 by Yordan Chavez RN  Outcome: Ongoing, Progressing  6/7/2022 1828 by Yordan Chavez RN  Outcome: Ongoing, Progressing    A/O x4. VSS. Denied pain. Urine watermelon colored. Bladder irrigated in the morning per order. Jacques catheter removed at 1300. Large incontinance x1 and urine output 50 ml in urinal. Bladder scan 45 at 1700. Using purewick.                          "

## 2022-06-08 ENCOUNTER — APPOINTMENT (OUTPATIENT)
Dept: PHYSICAL THERAPY | Facility: HOSPITAL | Age: 82
DRG: 686 | End: 2022-06-08
Payer: COMMERCIAL

## 2022-06-08 ENCOUNTER — APPOINTMENT (OUTPATIENT)
Dept: OCCUPATIONAL THERAPY | Facility: HOSPITAL | Age: 82
DRG: 686 | End: 2022-06-08
Payer: COMMERCIAL

## 2022-06-08 LAB
HGB BLD-MCNC: 14.7 G/DL (ref 13.3–17.7)
HOLD SPECIMEN: NORMAL
INR PPP: 2.27 (ref 0.85–1.15)

## 2022-06-08 PROCEDURE — 36415 COLL VENOUS BLD VENIPUNCTURE: CPT | Performed by: INTERNAL MEDICINE

## 2022-06-08 PROCEDURE — 97116 GAIT TRAINING THERAPY: CPT | Mod: GP

## 2022-06-08 PROCEDURE — 120N000001 HC R&B MED SURG/OB

## 2022-06-08 PROCEDURE — 97535 SELF CARE MNGMENT TRAINING: CPT | Mod: GO

## 2022-06-08 PROCEDURE — 85610 PROTHROMBIN TIME: CPT | Performed by: INTERNAL MEDICINE

## 2022-06-08 PROCEDURE — 250N000013 HC RX MED GY IP 250 OP 250 PS 637: Performed by: INTERNAL MEDICINE

## 2022-06-08 PROCEDURE — 250N000013 HC RX MED GY IP 250 OP 250 PS 637: Performed by: FAMILY MEDICINE

## 2022-06-08 PROCEDURE — 85018 HEMOGLOBIN: CPT | Performed by: FAMILY MEDICINE

## 2022-06-08 PROCEDURE — 99233 SBSQ HOSP IP/OBS HIGH 50: CPT | Performed by: FAMILY MEDICINE

## 2022-06-08 RX ORDER — POLYETHYLENE GLYCOL 3350 17 G/17G
17 POWDER, FOR SOLUTION ORAL DAILY
Status: DISCONTINUED | OUTPATIENT
Start: 2022-06-08 | End: 2022-06-13 | Stop reason: HOSPADM

## 2022-06-08 RX ORDER — WARFARIN SODIUM 3 MG/1
3 TABLET ORAL
Status: COMPLETED | OUTPATIENT
Start: 2022-06-08 | End: 2022-06-08

## 2022-06-08 RX ORDER — AMOXICILLIN 250 MG
1 CAPSULE ORAL 2 TIMES DAILY
Status: DISCONTINUED | OUTPATIENT
Start: 2022-06-08 | End: 2022-06-13 | Stop reason: HOSPADM

## 2022-06-08 RX ADMIN — METOPROLOL TARTRATE 50 MG: 25 TABLET, FILM COATED ORAL at 20:14

## 2022-06-08 RX ADMIN — SENNOSIDES AND DOCUSATE SODIUM 1 TABLET: 8.6; 5 TABLET ORAL at 20:14

## 2022-06-08 RX ADMIN — ATORVASTATIN CALCIUM 10 MG: 10 TABLET, FILM COATED ORAL at 20:15

## 2022-06-08 RX ADMIN — LEVOTHYROXINE SODIUM 50 MCG: 0.03 TABLET ORAL at 06:10

## 2022-06-08 RX ADMIN — LOSARTAN POTASSIUM 50 MG: 50 TABLET, FILM COATED ORAL at 08:08

## 2022-06-08 RX ADMIN — POLYETHYLENE GLYCOL 3350 17 G: 17 POWDER, FOR SOLUTION ORAL at 20:14

## 2022-06-08 RX ADMIN — WARFARIN SODIUM 3 MG: 3 TABLET ORAL at 17:52

## 2022-06-08 RX ADMIN — SENNOSIDES AND DOCUSATE SODIUM 1 TABLET: 8.6; 5 TABLET ORAL at 08:08

## 2022-06-08 RX ADMIN — METOPROLOL TARTRATE 50 MG: 25 TABLET, FILM COATED ORAL at 08:07

## 2022-06-08 ASSESSMENT — ACTIVITIES OF DAILY LIVING (ADL)
ADLS_ACUITY_SCORE: 45

## 2022-06-08 NOTE — PROGRESS NOTES
[facility]    Medicine Progress Note - Hospitalist Service       Date of Admission:  6/2/2022  Principal Problem:    Hematuria  Active Problems:    (HFpEF) heart failure with preserved ejection fraction (H)    Acquired hypothyroidism    Chronic atrial fibrillation (H)    H/O mitral valve replacement with mechanical valve    Hypertension    Transitional cell carcinoma (H)    Malignant neoplasm of urinary bladder, unspecified site (H)    History of stroke     Assessment & Plan            81-year-old male with a past history of bladder cancer, chronic atrial fibrillation, mechanical mitral valve replacement on warfarin, left MCA CVA in 2019, left subdural hematoma in 2021, hypothyroidism, diastolic CHF admitted for gross hematuria    Hematuria-  Has known transitional cell carcinoma.  CT urogram shows transmural extension of multifocal bladder masses through the bladder wall, no evidence of ureteral obstruction  Urology consulted  Improved hematuria with CBI, now discontinued, Jacques removed  Hematuria improved but still concerning to patient though hemoglobin stable.  Urology to assess again tomorrow  Continue warfarin    History of mechanical mitral valve replacement  Continue warfarin, pharmacy to dose.  Goal 2.5-3.5 INR.    Chronic atrial fibrillation-  Rate controlled  Continue metoprolol and warfarin    Accelerated hypertension-  Improved.  Continue metoprolol and losartan    History of transitional cell carcinoma of the bladder  Has undergone 2 doses of Keytruda starting on 5/2/2022.  Follows with [facility] urology/oncology.  Their plan is to r reassess after 3 months of therapy    Constipation-  Add MiraLAX to senna, suppository tomorrow if no results     Diet: Regular Diet Adult  Room Service    DVT Prophylaxis: Warfarin  Jacques Catheter: Not present  Central Lines: None  Code Status: Full Code      Disposition Plan   Expected Discharge: 06/09/2022     Anticipated discharge  "location:  Awaiting care coordination huddle  Delays:     Placement - TCU            The patient's care was discussed with the Bedside Nurse, Care Coordinator/, Patient, Patient's Family and Urology Consultant. for total time 38 minutes with greater than 50% of total time spent in counseling and coordination of care.    BRITANY DANIEL MD  Hospitalist Service  [facility]  Securely message with the Vocera Web Console (learn more here)  Text page via [employer] Paging/Directory        Clinically Significant Risk Factors Present on Admission                    ______________________________________________________________________    Interval History   Remainder of 12 point review of systems negative except as noted below    Subjective:  Patient doing okay.  Very concerned about ongoing hematuria though it has improved.  No chest pain, orthopnea, abdominal pain or shortness of breath.    Data reviewed today: I reviewed all medications, new labs and imaging results over the last 24 hours.     Physical Exam   Vital Signs: Temp: 98.5  F (36.9  C) Temp src: Oral BP: 131/58 Pulse: 88   Resp: 18 SpO2: 98 % O2 Device: None (Room air)    Weight: 122 lbs 0 oz  Physical Exam:  Temp:  [97.7  F (36.5  C)-98.5  F (36.9  C)] 98.5  F (36.9  C)  Pulse:  [66-88] 88  Resp:  [16-18] 18  BP: (110-149)/(58-73) 131/58  SpO2:  [98 %-99 %] 98 %    /58 (BP Location: Right arm)   Pulse 88   Temp 98.5  F (36.9  C) (Oral)   Resp 18   Ht 1.676 m (5' 6\")   Wt 55.3 kg (122 lb)   SpO2 98%   BMI 19.69 kg/m    General appearance: alert, appears stated age and cooperative  Head: Normocephalic, without obvious abnormality, atraumatic  Eyes: Clear conjuctiva  Neck: no JVD and supple, symmetrical, trachea midline  Lungs: clear to auscultation bilaterally  Heart irregular rate and rhythm  Abdomen: soft, non-tender; bowel sounds normal; no masses,  no organomegaly  Extremities: Negative homans,   Skin: Skin color, " texture, turgor normal. No rashes or lesions  Neurologic: Grossly normal          Data   Recent Labs   Lab 06/08/22  0647 06/07/22  0624 06/06/22  0713 06/05/22  0720 06/04/22  0700 06/03/22  0611 06/02/22  1805 06/02/22  1629 06/02/22  1621   WBC  --   --   --   --   --  8.8  --   --  6.7   HGB 14.7 14.5 14.7 15.0   < > 14.5  --   --  15.3   MCV  --   --   --   --   --  91  --   --  92   PLT  --   --   --   --   --  178  --   --  193   INR 2.27* 2.79* 3.31* 3.34*   < > 2.59*  --    < >  --    NA  --   --   --   --   --   --  138  --   --    POTASSIUM  --   --   --   --   --   --  4.0  --   --    CHLORIDE  --   --   --   --   --   --  105  --   --    CO2  --   --   --   --   --   --  24  --   --    BUN  --   --   --   --   --   --  16  --   --    CR  --   --  0.76 0.83  --   --  0.89  --   --    ANIONGAP  --   --   --   --   --   --  9  --   --    JEANETH  --   --   --   --   --   --  9.4  --   --    GLC  --   --   --   --   --   --  122  --   --     < > = values in this interval not displayed.     No results found for this or any previous visit (from the past 24 hour(s)).

## 2022-06-08 NOTE — PLAN OF CARE
Problem: Pain Acute  Goal: Acceptable Pain Control and Functional Ability  Outcome: Ongoing, Progressing     Problem: Plan of Care - These are the overarching goals to be used throughout the patient stay.    Goal: Absence of Hospital-Acquired Illness or Injury  Intervention: Identify and Manage Fall Risk  Recent Flowsheet Documentation  Taken 6/8/2022 0030 by Devon Vaughn, RN  Safety Promotion/Fall Prevention: activity supervised  Taken 6/7/2022 2007 by Devon Vaughn, RN  Safety Promotion/Fall Prevention: activity supervised       Goal Outcome Evaluation:    Denies pain or discomfort. Connected to primofit overnight, 550 ml OP.  Urine is dark merritt.

## 2022-06-08 NOTE — PROGRESS NOTES
ESTELA met with Pt and  and gave list for TCU.  Pt will need a [facility] auth.  Referrals sent: [facility] and than [facility] [place] [facility] [place] [facility] [place] due to wife does not like to drive far.  PAS needed.  Transportation: TBD.     4:31 PM  JB Alaniz

## 2022-06-08 NOTE — PLAN OF CARE
Problem: Plan of Care - These are the overarching goals to be used throughout the patient stay.    Goal: Absence of Hospital-Acquired Illness or Injury  Intervention: Identify and Manage Fall Risk  Recent Flowsheet Documentation  Taken 6/8/2022 0900 by Juan Mccoy RN  Safety Promotion/Fall Prevention:    bed alarm on    clutter free environment maintained    lighting adjusted     Problem: Plan of Care - These are the overarching goals to be used throughout the patient stay.    Goal: Absence of Hospital-Acquired Illness or Injury  Intervention: Prevent Skin Injury  Recent Flowsheet Documentation  Taken 6/8/2022 0900 by Juan Mccoy RN  Body Position: position changed independently     Problem: Plan of Care - These are the overarching goals to be used throughout the patient stay.    Goal: Absence of Hospital-Acquired Illness or Injury  Intervention: Prevent and Manage VTE (Venous Thromboembolism) Risk  Recent Flowsheet Documentation  Taken 6/8/2022 0900 by Juan Mccoy RN  Activity Management: activity adjusted per tolerance     Problem: Hypertension Comorbidity  Goal: Blood Pressure in Desired Range  Intervention: Maintain Blood Pressure Management  Recent Flowsheet Documentation  Taken 6/8/2022 0900 by Juan Mccoy RN  Medication Review/Management: medications reviewed     Problem: Pain Acute  Goal: Acceptable Pain Control and Functional Ability  Outcome: Ongoing, Progressing  Intervention: Prevent or Manage Pain  Recent Flowsheet Documentation  Taken 6/8/2022 0900 by Juan Mccoy RN  Medication Review/Management: medications reviewed   Goal Outcome Evaluation:    Patient alert and oriented x4. Patient denies pain. Encouraged weight shifting and repositioning.     Juan Mccoy RN

## 2022-06-08 NOTE — PROGRESS NOTES
Place of Service:  [facility]     Reason for follow up: gross hematuria, hx of recurrent bladder cancer    SUBJECTIVE:  Events: no acute events overnight    Patient reports feeling well. He was out walking with rehabilitation prior to being seen. Patient denies abdominal/flank pain, fevers, chills. He has been able to urinate without difficulty. He does report there is still blood in his urine but no clotting or sediment.    Spouse present and agrees. I reiterated that we are concerned with the hematuria but he will have follow-up with [facility] for his ongoing bladder cancer care.    Vitals stable, afebrile. Normal urinary output 450ml this morning (per nursing staff he has been able to urinate without issues and haven't needed to bladder scan). Urine is in the room and clear cherry red colored urine without clot or sediment. HGB stable 14.7, INR 2.27, creatinine yesterday is normal 0.76.    OBJECTIVE:  PHYSICAL EXAM:  Temp: 97.8  F (36.6  C) Temp src: Oral BP: (!) 149/73 Pulse: 84   Resp: 18 SpO2: 99 % O2 Device: None (Room air)    General: NAD, alert, cooperative  Head: normocephalic, without abnormality / atraumatic  Abdomen: soft, non tender, non distended. no suprapubic fullness, no suprapubic tenderness. no CVA tenderness  Psychological: alert and oriented, answers questions appropriately    LABS:  Creatinine   Date Value Ref Range Status   06/06/2022 0.76 0.70 - 1.30 mg/dL Final     WBC Count   Date Value Ref Range Status   06/03/2022 8.8 4.0 - 11.0 10e3/uL Final     Hemoglobin   Date Value Ref Range Status   06/08/2022 14.7 13.3 - 17.7 g/dL Final   ]  Platelet Count   Date Value Ref Range Status   06/03/2022 178 150 - 450 10e3/uL Final       UA:  UA RESULTS:  Recent Labs   Lab Test 06/02/22  1635 08/02/19  1709   COLOR Red* Yellow   APPEARANCE Cloudy* Cloudy*   URINEGLC Negative Negative   URINEBILI Negative Negative   URINEKETONE Negative Negative   SG 1.009 1.018   UBLD >1.0 mg/dL*  Large*   URINEPH 6.5 7.5   PROTEIN 100 * Trace*   UROBILINOGEN  --  8.0 E.U./dL*   NITRITE Negative Negative   LEUKEST 75 Denis/uL* Large*   RBCU >182* *   WBCU 30* >100*         Cultures:  Urine culture 6/2 with mixture of urogenital iain.    Blood none    Lab Results: personally reviewed.     ASSESSMENT/PLAN:  Javon Cota is being seen by [facility] for gross hematuria, hx of recurrent bladder cancer    - Patient continues to be able to void in his own- clear cherry colored urine without sever sediment or clotting. Reiterated to have nursing staff bladder scan if he develops symptoms of inability to void, suprapubic pain/fullness. Replace catheter if he has two PVR scans of >250ml.  - On coumadin for mechanical valve- 2.27.   - HGB stable 14.7 today.  - Reiterated that he should follow-up with  Urology at discharge.      Jose Roy PA-C  [facility]   [phone]

## 2022-06-09 ENCOUNTER — APPOINTMENT (OUTPATIENT)
Dept: OCCUPATIONAL THERAPY | Facility: HOSPITAL | Age: 82
DRG: 686 | End: 2022-06-09
Payer: COMMERCIAL

## 2022-06-09 LAB
HGB BLD-MCNC: 14.8 G/DL (ref 13.3–17.7)
HOLD SPECIMEN: NORMAL
INR PPP: 2.13 (ref 0.85–1.15)

## 2022-06-09 PROCEDURE — 85610 PROTHROMBIN TIME: CPT | Performed by: INTERNAL MEDICINE

## 2022-06-09 PROCEDURE — 120N000001 HC R&B MED SURG/OB

## 2022-06-09 PROCEDURE — 250N000013 HC RX MED GY IP 250 OP 250 PS 637: Performed by: INTERNAL MEDICINE

## 2022-06-09 PROCEDURE — 250N000013 HC RX MED GY IP 250 OP 250 PS 637: Performed by: FAMILY MEDICINE

## 2022-06-09 PROCEDURE — 36415 COLL VENOUS BLD VENIPUNCTURE: CPT | Performed by: FAMILY MEDICINE

## 2022-06-09 PROCEDURE — 36415 COLL VENOUS BLD VENIPUNCTURE: CPT | Performed by: INTERNAL MEDICINE

## 2022-06-09 PROCEDURE — 85018 HEMOGLOBIN: CPT | Performed by: FAMILY MEDICINE

## 2022-06-09 PROCEDURE — 97535 SELF CARE MNGMENT TRAINING: CPT | Mod: GO

## 2022-06-09 PROCEDURE — 99233 SBSQ HOSP IP/OBS HIGH 50: CPT | Performed by: FAMILY MEDICINE

## 2022-06-09 RX ORDER — WARFARIN SODIUM 2 MG/1
4 TABLET ORAL
Status: COMPLETED | OUTPATIENT
Start: 2022-06-09 | End: 2022-06-09

## 2022-06-09 RX ADMIN — SENNOSIDES AND DOCUSATE SODIUM 1 TABLET: 8.6; 5 TABLET ORAL at 20:51

## 2022-06-09 RX ADMIN — LEVOTHYROXINE SODIUM 50 MCG: 0.03 TABLET ORAL at 05:36

## 2022-06-09 RX ADMIN — METOPROLOL TARTRATE 50 MG: 25 TABLET, FILM COATED ORAL at 20:51

## 2022-06-09 RX ADMIN — METOPROLOL TARTRATE 50 MG: 25 TABLET, FILM COATED ORAL at 08:22

## 2022-06-09 RX ADMIN — ATORVASTATIN CALCIUM 10 MG: 10 TABLET, FILM COATED ORAL at 20:51

## 2022-06-09 RX ADMIN — SENNOSIDES AND DOCUSATE SODIUM 1 TABLET: 8.6; 5 TABLET ORAL at 08:21

## 2022-06-09 RX ADMIN — LOSARTAN POTASSIUM 50 MG: 50 TABLET, FILM COATED ORAL at 08:22

## 2022-06-09 RX ADMIN — WARFARIN SODIUM 4 MG: 2 TABLET ORAL at 18:04

## 2022-06-09 RX ADMIN — POLYETHYLENE GLYCOL 3350 17 G: 17 POWDER, FOR SOLUTION ORAL at 08:22

## 2022-06-09 ASSESSMENT — ACTIVITIES OF DAILY LIVING (ADL)
ADLS_ACUITY_SCORE: 45

## 2022-06-09 NOTE — PROGRESS NOTES
Place of Service:  [facility]     Reason for follow up: gross hematuria, hx of recurrent bladder cancer    SUBJECTIVE:  Events: no acute events overnight    Patient reports feeling well. Still has hematuria which he and his wife are concerned about but has not seen a drop in hemoglobin. Denies fevers, chills, N/V, abdominal pain. Patient hasn't had any issues with emptying his bladder and denies suprapubic pain/pressure.    He and his wife have a lot of questions regarding his care and there appears to be some confusion. Dr. Johnson and I came into the room together to go over the care plan. I discussed the case with Dr. Barrett and there is no reason the patient needs to continue to be hospitalized from a urological standpoint.     Plan is to have him go to TCU once placement, and outpatient follow up with [facility] for ongoing care related to bladder cancer.     OBJECTIVE:  PHYSICAL EXAM:  Temp: 97.6  F (36.4  C) Temp src: Oral BP: (!) 145/70 Pulse: 72   Resp: 18 SpO2: 98 % O2 Device: None (Room air)    General: NAD, alert, cooperative  Head: normocephalic, without abnormality / atraumatic  Abdomen: soft, non tender, non distended. no suprapubic fullness, no suprapubic tenderness. no CVA tenderness,   Genitourinary: Wicking catheter in place, with ~300ml of clear hawaiian punch colored urine with minimal sediment/clot.  Psychological: alert and oriented, answers questions appropriately    LABS:  Creatinine   Date Value Ref Range Status   06/06/2022 0.76 0.70 - 1.30 mg/dL Final     WBC Count   Date Value Ref Range Status   06/03/2022 8.8 4.0 - 11.0 10e3/uL Final     Hemoglobin   Date Value Ref Range Status   06/09/2022 14.8 13.3 - 17.7 g/dL Final   ]  Platelet Count   Date Value Ref Range Status   06/03/2022 178 150 - 450 10e3/uL Final       UA:  UA RESULTS:  Recent Labs   Lab Test 06/02/22  1635 08/02/19  1709   COLOR Red* Yellow   APPEARANCE Cloudy* Cloudy*   URINEGLC Negative  Negative   URINEBILI Negative Negative   URINEKETONE Negative Negative   SG 1.009 1.018   UBLD >1.0 mg/dL* Large*   URINEPH 6.5 7.5   PROTEIN 100 * Trace*   UROBILINOGEN  --  8.0 E.U./dL*   NITRITE Negative Negative   LEUKEST 75 Denis/uL* Large*   RBCU >182* *   WBCU 30* >100*       Lab Results: personally reviewed.     ASSESSMENT/PLAN:  Javon oCta is being seen by [facility] for gross hematuria, hx of recurrent bladder cancer    - Patient reports feeling well. Hemoglobin continues to be stable despite hematuria. Hematuria remains clear, merritt/hawaiian punch colored without sediment/clot. Denies suprapubic pain/fullness or issues with emptying. Patient is also on coumadin for mechanical heart valve. Given bladder tumors on CT imaging and coumadin, hematuria is far more likely to occur.  - Outlined reasons to return to the ED/hospital including complete inability to urinate, heavy clotting in urine, or development of fevers, suprapubic pain/pressure.  - Patient can discharge to TCU from urological perspective.  - Patient requires close follow up with [facility] to continue to manage ongoing bladder cancer.    This case was discussed with:  Dr Jason Roy PA-C  [facility]   [phone]

## 2022-06-09 NOTE — PROGRESS NOTES
Care Management Follow Up    Length of Stay (days): 7    Expected Discharge Date: 06/09/2022     Concerns to be Addressed:       Patient plan of care discussed at interdisciplinary rounds: Yes    Anticipated Discharge Disposition:       Anticipated Discharge Services:    Anticipated Discharge DME:      Patient/family educated on Medicare website which has current facility and service quality ratings:    Education Provided on the Discharge Plan:    Patient/Family in Agreement with the Plan:      Referrals Placed by CM/SW:    Private pay costs discussed: Not applicable    Additional Information:  11:10 AM  ESTELA met with pt and spouse per request.  Spouse requested 4th referral be sent to [facility] .  SW sent referral.  SW left  with admissions for [facility], [facility] and [facility].    Bhavana Fuentes, [facility] HospitalSW

## 2022-06-09 NOTE — PLAN OF CARE
Problem: Plan of Care - These are the overarching goals to be used throughout the patient stay.    Goal: Plan of Care Review/Shift Note  Description: The Plan of Care Review/Shift note should be completed every shift.  The Outcome Evaluation is a brief statement about your assessment that the patient is improving, declining, or no change.  This information will be displayed automatically on your shift note.  Outcome: Ongoing, Progressing   Goal Outcome Evaluation:        Patient ate 100% of dinner. Patient did report he had a BM on the day shift. Urine is still rere colored. Using male purewick.

## 2022-06-09 NOTE — PROGRESS NOTES
[facility]    Medicine Progress Note - Hospitalist Service       Date of Admission:  6/2/2022  Principal Problem:    Hematuria  Active Problems:    (HFpEF) heart failure with preserved ejection fraction (H)    Acquired hypothyroidism    Chronic atrial fibrillation (H)    H/O mitral valve replacement with mechanical valve    Hypertension    Transitional cell carcinoma (H)    Malignant neoplasm of urinary bladder, unspecified site (H)    History of stroke     Assessment & Plan            81-year-old male with a past history of bladder cancer, chronic atrial fibrillation, mechanical mitral valve replacement on warfarin, left MCA CVA in 2019, left subdural hematoma in 2021, hypothyroidism, diastolic CHF admitted for gross hematuria    Hematuria-  Has known transitional cell carcinoma.  CT urogram shows transmural extension of multifocal bladder masses through the bladder wall, no evidence of ureteral obstruction  Urology consulted  Improved hematuria with CBI, now discontinued, Jacques removed  Having more rust colored urine, no further clots.  Hemoglobin very stable.  Okay to discharge from urology perspective with close follow-up with his oncologist/urologist at [facility]  Continue warfarin    History of mechanical mitral valve replacement  Continue warfarin, pharmacy to dose.  Goal 2.5-3.5 INR.  Mildly subtherapeutic, pharmacy addressing    Chronic atrial fibrillation-  Rate controlled  Continue metoprolol and warfarin    Accelerated hypertension-  Improved.  Continue metoprolol and losartan    History of transitional cell carcinoma of the bladder  Has undergone 2 doses of Keytruda starting on 5/2/2022.  Follows with [facility] urology/oncology.  Their plan is to r reassess after 3 months of therapy  Needs to follow-up with them next week    Constipation-  Add MiraLAX to senna,  Having bowel movements    Past history of CVA-  Has mild right lower extremity weakness.  PT OT     Diet:  "Regular Diet Adult  Room Service    DVT Prophylaxis: Warfarin  Jacques Catheter: Not present  Central Lines: None  Code Status: Full Code      Disposition Plan   Expected Discharge: 06/09/2022     Anticipated discharge location:  Awaiting care coordination huddle  Delays:     Placement - TCU            The patient's care was discussed with the Bedside Nurse, Care Coordinator/, Patient, Patient's Family and Urology Consultant. for total time 38 minutes with greater than 50% of total time spent in counseling and coordination of care.    BRITANY DANIEL MD  Hospitalist Service  [facility]  Securely message with the Vocera Web Console (learn more here)  Text page via [employer] Paging/Directory        Clinically Significant Risk Factors Present on Admission                    ______________________________________________________________________    Interval History   Remainder of 12 point review of systems negative except as noted below    Subjective:  Patient doing well.  Had a bowel movement yesterday.  Hematuria stable but no decrease in hemoglobin.  No abdominal pain.  Voidin  Data reviewed today: I reviewed all medications, new labs and imaging results over the last 24 hours.     Physical Exam   Vital Signs: Temp: 97.6  F (36.4  C) Temp src: Oral BP: (!) 145/70 Pulse: 72   Resp: 18 SpO2: 98 % O2 Device: None (Room air)    Weight: 122 lbs 0 oz  Physical Exam:  Temp:  [97.6  F (36.4  C)-98.5  F (36.9  C)] 97.6  F (36.4  C)  Pulse:  [72-88] 72  Resp:  [18] 18  BP: (130-145)/(56-70) 145/70  SpO2:  [97 %-98 %] 98 %    BP (!) 145/70 (BP Location: Left arm)   Pulse 72   Temp 97.6  F (36.4  C) (Oral)   Resp 18   Ht 1.676 m (5' 6\")   Wt 55.3 kg (122 lb)   SpO2 98%   BMI 19.69 kg/m    General appearance: alert, appears stated age and cooperative  Head: Normocephalic, without obvious abnormality, atraumatic  Eyes: Clear conjuctiva  Neck: no JVD and supple, symmetrical, trachea " midline  Lungs: clear to auscultation bilaterally  Heart irregular rate and rhythm  Abdomen: soft, non-tender; bowel sounds normal; no masses,  no organomegaly  Extremities: Negative homans,   Skin: Skin color, texture, turgor normal. No rashes or lesions  Neurologic: Grossly normal          Data   Recent Labs   Lab 06/09/22  0730 06/09/22  0654 06/08/22  0647 06/07/22  0624 06/06/22  0713 06/05/22  0720 06/04/22  0700 06/03/22  0611 06/02/22  1805 06/02/22  1629 06/02/22  1621   WBC  --   --   --   --   --   --   --  8.8  --   --  6.7   HGB 14.8  --  14.7 14.5 14.7 15.0   < > 14.5  --   --  15.3   MCV  --   --   --   --   --   --   --  91  --   --  92   PLT  --   --   --   --   --   --   --  178  --   --  193   INR  --  2.13* 2.27* 2.79* 3.31* 3.34*   < > 2.59*  --    < >  --    NA  --   --   --   --   --   --   --   --  138  --   --    POTASSIUM  --   --   --   --   --   --   --   --  4.0  --   --    CHLORIDE  --   --   --   --   --   --   --   --  105  --   --    CO2  --   --   --   --   --   --   --   --  24  --   --    BUN  --   --   --   --   --   --   --   --  16  --   --    CR  --   --   --   --  0.76 0.83  --   --  0.89  --   --    ANIONGAP  --   --   --   --   --   --   --   --  9  --   --    JEANETH  --   --   --   --   --   --   --   --  9.4  --   --    GLC  --   --   --   --   --   --   --   --  122  --   --     < > = values in this interval not displayed.     No results found for this or any previous visit (from the past 24 hour(s)).

## 2022-06-09 NOTE — PROGRESS NOTES
"CLINICAL NUTRITION SERVICES - ASSESSMENT NOTE     Nutrition Prescription    RECOMMENDATIONS FOR MDs/PROVIDERS TO ORDER:  None    Malnutrition Status:    Severe in the context of chronic illness    Recommendations already ordered by Registered Dietitian (RD):  Ensure enlive BID = 700 kcal, 40 g protein    New weight    Future/Additional Recommendations:  Adjust supplements pending intake, acceptance, weight     REASON FOR ASSESSMENT  Javon Cota is a/an 81 year old male assessed by the dietitian for LOS    Pt presents with hematuria  Hx transitional bladder CA, bladder perforation, MVR, CVA, CHF, HTN    NUTRITION HISTORY  Wife reports pt has had no change in intake and was losing weight at home after CA dx though no decrease in intake.   Pt taking ensure at breakfast, eating a full lunch and supper.   Wife reports pt is unable to drink more than 1 ensure/day or he will not be hungry for meals.     Wife is very concerned for his significant noted fat and muscle loss since CA dx. 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   Pt has decreased mobility in RLE d/t previous CVA    CURRENT NUTRITION ORDERS  Diet: Regular    Intake/Tolerance: Sparse intake documentation. % at the meals that are documented  Initially ordering less, <1300 kcal/day. Increased last 2 days 7340-6687 kcal, 58-67 g protein/day    Wife reports she has been bringing food for him every other day    LABS  Labs reviewed    MEDICATIONS  Medications reviewed  Lipitor, miralax daily, pericolace bid, warfarin    ANTHROPOMETRICS  Height: 167.6 cm (5' 6\")  Most Recent Weight: 55.3 kg (122 lb) 6/2 - likely stated weight  IBW: 64.5 kg  BMI: Normal BMI  Weight History:   Wt Readings from Last 10 Encounters:   06/02/22 05/23/22 55.3 kg (122 lb)  55.4 kg (122 lb 1.6 oz)    07/23/19 64 kg (141 lb)   07/23/19 64 kg (141 lb)     Wife reports weight loss started with recurrent CA DX in April: 13.4% weight loss x 2 " months    Dosing Weight: 55.3 kg    ASSESSED NUTRITION NEEDS  Estimated Energy Needs: 4201-8387 kcals/day (25 - 30 kcals/kg)  Justification: Maintenance  Estimated Protein Needs: 55-66 grams protein/day (1 - 1.2 grams of pro/kg)  Justification: Increased needs  Estimated Fluid Needs: 0523-8247 mL/day (25 - 30 mL/kg)   Justification: Maintenance    PHYSICAL FINDINGS  See malnutrition section below.    MALNUTRITION:  % Weight Loss:  > 7.5% in 3 months (severe malnutrition)  % Intake:  No decreased intake noted  Subcutaneous Fat Loss:  Orbital region mild depletion  Muscle Loss:  Temporal region mild depletion, Clavicle bone region moderate depletion, Acromion bone region severe depletion, Dorsal hand region mild depletion, Patellar region moderate depletion, Anterior thigh region moderate depletion, UE severe depletion  Fluid Retention:  None noted  Wife and pt noting significant fat and muscle loss    Malnutrition Diagnosis: Severe malnutrition  In Context of:  Chronic illness or disease    NUTRITION DIAGNOSIS  Unintended weight loss related to CA dx and affects as evidenced by 13.4% weight loss x 3 months, severe muscle loss      INTERVENTIONS  Implementation  Nutrition education for nutrition relationship to health/disease: Encouraged protein and increased calorie intake. Encouraged increasing activity at home-helping out with in home activities to increase strength    Medical food supplement therapy     Ordered new weight      Goals  Intake > 75% of estimated nutrition needs  No weight loss below 122 lb     Monitoring/Evaluation  Progress toward goals will be monitored and evaluated per protocol.    Janeen Wooten RDN, LD  #[phone]

## 2022-06-09 NOTE — PLAN OF CARE
Problem: Plan of Care - These are the overarching goals to be used throughout the patient stay.    Goal: Absence of Hospital-Acquired Illness or Injury  Intervention: Identify and Manage Fall Risk  Recent Flowsheet Documentation  Taken 6/9/2022 0830 by Juan Mccoy RN  Safety Promotion/Fall Prevention:   bed alarm on   chair alarm on   clutter free environment maintained   fall prevention program maintained   lighting adjusted   patient and family education   nonskid shoes/slippers when out of bed     Problem: Plan of Care - These are the overarching goals to be used throughout the patient stay.    Goal: Absence of Hospital-Acquired Illness or Injury  Intervention: Prevent and Manage VTE (Venous Thromboembolism) Risk  Recent Flowsheet Documentation  Taken 6/9/2022 0830 by Juan Mccoy RN  Activity Management:   activity adjusted per tolerance   activity encouraged     Problem: Hypertension Comorbidity  Goal: Blood Pressure in Desired Range  Outcome: Ongoing, Progressing  Intervention: Maintain Blood Pressure Management  Recent Flowsheet Documentation  Taken 6/9/2022 0830 by Juan Mccoy RN  Medication Review/Management: medications reviewed     Problem: Pain Acute  Goal: Acceptable Pain Control and Functional Ability  Outcome: Ongoing, Progressing  Intervention: Prevent or Manage Pain  Recent Flowsheet Documentation  Taken 6/9/2022 0830 by Juan Mccoy RN  Medication Review/Management: medications reviewed   Goal Outcome Evaluation:    Encouraged patient to turn/reposition. Awaiting placement at TCU.     Juan Mccoy RN

## 2022-06-09 NOTE — PLAN OF CARE
Problem: Plan of Care - These are the overarching goals to be used throughout the patient stay.    Goal: Optimal Comfort and Wellbeing  Outcome: Ongoing, Progressing    Problem: Pain Acute  Goal: Acceptable Pain Control and Functional Ability  Outcome: Ongoing, Progressing          Goal Outcome Evaluation:    No events overnight.  900 ML OP from primofit canister.  Denies pain or discomfort.

## 2022-06-10 ENCOUNTER — APPOINTMENT (OUTPATIENT)
Dept: PHYSICAL THERAPY | Facility: HOSPITAL | Age: 82
DRG: 686 | End: 2022-06-10
Payer: COMMERCIAL

## 2022-06-10 ENCOUNTER — APPOINTMENT (OUTPATIENT)
Dept: OCCUPATIONAL THERAPY | Facility: HOSPITAL | Age: 82
DRG: 686 | End: 2022-06-10
Payer: COMMERCIAL

## 2022-06-10 LAB
INR PPP: 2.22 (ref 0.85–1.15)
SARS-COV-2 RNA RESP QL NAA+PROBE: NEGATIVE

## 2022-06-10 PROCEDURE — 999N000127 HC STATISTIC PERIPHERAL IV START W US GUIDANCE

## 2022-06-10 PROCEDURE — 250N000013 HC RX MED GY IP 250 OP 250 PS 637: Performed by: FAMILY MEDICINE

## 2022-06-10 PROCEDURE — 87635 SARS-COV-2 COVID-19 AMP PRB: CPT | Performed by: FAMILY MEDICINE

## 2022-06-10 PROCEDURE — 99232 SBSQ HOSP IP/OBS MODERATE 35: CPT | Performed by: FAMILY MEDICINE

## 2022-06-10 PROCEDURE — 36415 COLL VENOUS BLD VENIPUNCTURE: CPT | Performed by: INTERNAL MEDICINE

## 2022-06-10 PROCEDURE — 97535 SELF CARE MNGMENT TRAINING: CPT | Mod: GO

## 2022-06-10 PROCEDURE — 97530 THERAPEUTIC ACTIVITIES: CPT | Mod: GP

## 2022-06-10 PROCEDURE — 120N000001 HC R&B MED SURG/OB

## 2022-06-10 PROCEDURE — 250N000013 HC RX MED GY IP 250 OP 250 PS 637: Performed by: INTERNAL MEDICINE

## 2022-06-10 PROCEDURE — 97116 GAIT TRAINING THERAPY: CPT | Mod: GP

## 2022-06-10 PROCEDURE — 85610 PROTHROMBIN TIME: CPT | Performed by: INTERNAL MEDICINE

## 2022-06-10 RX ORDER — WARFARIN SODIUM 3 MG/1
3 TABLET ORAL
Status: COMPLETED | OUTPATIENT
Start: 2022-06-10 | End: 2022-06-10

## 2022-06-10 RX ADMIN — WARFARIN SODIUM 3 MG: 3 TABLET ORAL at 17:52

## 2022-06-10 RX ADMIN — LEVOTHYROXINE SODIUM 50 MCG: 0.03 TABLET ORAL at 07:38

## 2022-06-10 RX ADMIN — POLYETHYLENE GLYCOL 3350 17 G: 17 POWDER, FOR SOLUTION ORAL at 08:47

## 2022-06-10 RX ADMIN — ATORVASTATIN CALCIUM 10 MG: 10 TABLET, FILM COATED ORAL at 19:50

## 2022-06-10 RX ADMIN — LOSARTAN POTASSIUM 50 MG: 50 TABLET, FILM COATED ORAL at 08:47

## 2022-06-10 RX ADMIN — METOPROLOL TARTRATE 50 MG: 25 TABLET, FILM COATED ORAL at 19:49

## 2022-06-10 RX ADMIN — SENNOSIDES AND DOCUSATE SODIUM 1 TABLET: 8.6; 5 TABLET ORAL at 08:47

## 2022-06-10 RX ADMIN — METOPROLOL TARTRATE 50 MG: 25 TABLET, FILM COATED ORAL at 08:47

## 2022-06-10 ASSESSMENT — ACTIVITIES OF DAILY LIVING (ADL)
ADLS_ACUITY_SCORE: 45
ADLS_ACUITY_SCORE: 44
ADLS_ACUITY_SCORE: 45
ADLS_ACUITY_SCORE: 44
ADLS_ACUITY_SCORE: 45
ADLS_ACUITY_SCORE: 45

## 2022-06-10 NOTE — PLAN OF CARE
Goal Outcome Evaluation:        Problem: Plan of Care - These are the overarching goals to be used throughout the patient stay.    Goal: Optimal Comfort and Wellbeing  Outcome: Ongoing, Progressing     The patient was alert and oriented x4. The patient denied pain during the shift. The patient ate food brought from home. Wife remains with patient in room offering support. The patient denied having abdominal discomfort. Urine remains tea with a slight amount of red in color. No clots were present.The patient had a total out put of 650 ml. Comfort was optimized with encouragement of repositioning and fluids.

## 2022-06-10 NOTE — PROGRESS NOTES
Care Management Follow Up    Length of Stay (days): 8    Expected Discharge Date: 06/10/2022     Concerns to be Addressed:       Patient plan of care discussed at interdisciplinary rounds: Yes    Anticipated Discharge Disposition:       Anticipated Discharge Services:    Anticipated Discharge DME:      Patient/family educated on Medicare website which has current facility and service quality ratings:    Education Provided on the Discharge Plan:    Patient/Family in Agreement with the Plan:      Referrals Placed by CM/ESTELA:    Private pay costs discussed: Not applicable    Additional Information:  3:38 PM  ESTELA spoke with Rebel at [facility].  She can accept pending [facility] prior auth.  She submitted for prior auth 6/10.  Facility does not admit on weekends.  CM to follow up on Monday to see if prior auth received.  Rebel stated Keytruda med will need to be held at facility.  Chemo will need to be held as well.  ESTELA spoke with wife around this and she is in agreement to holding until pt is out of facility.      CHARITO Bray

## 2022-06-10 NOTE — PROGRESS NOTES
[facility]    Medicine Progress Note - Hospitalist Service       Date of Admission:  6/2/2022  Principal Problem:    Hematuria  Active Problems:    (HFpEF) heart failure with preserved ejection fraction (H)    Acquired hypothyroidism    Chronic atrial fibrillation (H)    H/O mitral valve replacement with mechanical valve    Hypertension    Transitional cell carcinoma (H)    Malignant neoplasm of urinary bladder, unspecified site (H)    History of stroke     Assessment & Plan            81-year-old male with a past history of bladder cancer, chronic atrial fibrillation, mechanical mitral valve replacement on warfarin, left MCA CVA in 2019, left subdural hematoma in 2021, hypothyroidism, diastolic CHF admitted for gross hematuria    Hematuria-  Has known transitional cell carcinoma.  CT urogram shows transmural extension of multifocal bladder masses through the bladder wall, no evidence of ureteral obstruction  Urology consulted  Improved hematuria with CBI, now discontinued, Jacques removed  Having more rust colored urine, no further clots.  Hemoglobin very stable.  Okay to discharge from urology perspective with close follow-up with his oncologist/urologist at [facility]  Hematuria continues to improve  Continue warfarin      History of mechanical mitral valve replacement  Continue warfarin, pharmacy to dose.  Goal 2.5-3.5 INR.  Mildly subtherapeutic, pharmacy addressing    Chronic atrial fibrillation-  Rate controlled  Continue metoprolol and warfarin    Accelerated hypertension-  Improved.  Continue metoprolol and losartan    History of transitional cell carcinoma of the bladder  Has undergone 2 doses of Keytruda starting on 5/2/2022.  Follows with [facility] urology/oncology.  Their plan is to r reassess after 3 months of therapy  Needs to follow-up with them next week    Constipation-  Add MiraLAX to senna,  Having bowel movements    Past history of CVA-  Has mild right lower  "extremity weakness.  PT OT     Diet: Regular Diet Adult  Room Service  Snacks/Supplements Adult: Ensure Enlive; With Meals  Snacks/Supplements Adult: Ensure Enlive; Between Meals    DVT Prophylaxis: Warfarin  Jacques Catheter: Not present  Central Lines: None  Code Status: Full Code      Disposition Plan   Expected Discharge: 06/10/2022     Anticipated discharge location:  Awaiting care coordination huddle  Delays:     Placement - TCU            The patient's care was discussed with the Bedside Nurse, Care Coordinator/, Patient, Patient's Family and Urology Consultant. for total time 38 minutes with greater than 50% of total time spent in counseling and coordination of care.    BRITANY DANIEL MD  Hospitalist Service  [facility]  Securely message with the Vocera Web Console (learn more here)  Text page via [employer] Paging/Directory        Clinically Significant Risk Factors Present on Admission                    ______________________________________________________________________    Interval History   Remainder of 12 point review of systems negative except as noted below    Subjective:  Patient continues to improve.  Having bowel movements.  Hematuria improved.  No abdominal pain.  Voiding.    Data reviewed today: I reviewed all medications, new labs and imaging results over the last 24 hours.     Physical Exam   Vital Signs: Temp: 97.8  F (36.6  C) Temp src: Oral BP: (!) 143/65 Pulse: 81   Resp: 19 SpO2: 100 % O2 Device: None (Room air)    Weight: 122 lbs 0 oz  Physical Exam:  Temp:  [97.4  F (36.3  C)-97.8  F (36.6  C)] 97.8  F (36.6  C)  Pulse:  [78-96] 81  Resp:  [17-20] 19  BP: (137-143)/(61-76) 143/65  SpO2:  [98 %-100 %] 100 %    BP (!) 143/65   Pulse 81   Temp 97.8  F (36.6  C) (Oral)   Resp 19   Ht 1.676 m (5' 6\")   Wt 55.3 kg (122 lb)   SpO2 100%   BMI 19.69 kg/m    General appearance: alert, appears stated age and cooperative  Head: Normocephalic, without obvious " abnormality, atraumatic  Eyes: Clear conjuctiva  Neck: no JVD and supple, symmetrical, trachea midline  Lungs: clear to auscultation bilaterally  Heart irregular rate and rhythm  Abdomen: soft, non-tender; bowel sounds normal; no masses,  no organomegaly  Extremities: Negative homans,   Skin: Skin color, texture, turgor normal. No rashes or lesions  Neurologic: Grossly normal          Data   Recent Labs   Lab 06/10/22  0747 06/09/22  0730 06/09/22  0654 06/08/22  0647 06/07/22  0624 06/06/22  0713 06/05/22  0720   HGB  --  14.8  --  14.7 14.5 14.7 15.0   INR 2.22*  --  2.13* 2.27* 2.79* 3.31* 3.34*   CR  --   --   --   --   --  0.76 0.83     No results found for this or any previous visit (from the past 24 hour(s)).

## 2022-06-10 NOTE — PLAN OF CARE
Goal Outcome Evaluation:  Alert and oriented x 4. Denied pain. Assist of 1 to the BR. Had Large BM x 1. 450 ml tea color urine out of puerwick catheter.  Wife in the room. Weak and sleepy most of the day.     Diana Chavarria RN

## 2022-06-10 NOTE — PLAN OF CARE
Problem: Plan of Care - These are the overarching goals to be used throughout the patient stay.    Goal: Plan of Care Review/Shift Note  Description: The Plan of Care Review/Shift note should be completed every shift.  The Outcome Evaluation is a brief statement about your assessment that the patient is improving, declining, or no change.  This information will be displayed automatically on your shift note.  Outcome: Ongoing, Progressing   Goal Outcome Evaluation:      Patient slept well all night, denies pain, VSS,  900 ml urine output, slightly red/tea color.  Denies pain.

## 2022-06-11 ENCOUNTER — APPOINTMENT (OUTPATIENT)
Dept: PHYSICAL THERAPY | Facility: HOSPITAL | Age: 82
DRG: 686 | End: 2022-06-11
Payer: COMMERCIAL

## 2022-06-11 LAB
HOLD SPECIMEN: NORMAL
HOLD SPECIMEN: NORMAL
INR PPP: 2.45 (ref 0.85–1.15)

## 2022-06-11 PROCEDURE — 97116 GAIT TRAINING THERAPY: CPT | Mod: GP

## 2022-06-11 PROCEDURE — 250N000013 HC RX MED GY IP 250 OP 250 PS 637: Performed by: FAMILY MEDICINE

## 2022-06-11 PROCEDURE — 99232 SBSQ HOSP IP/OBS MODERATE 35: CPT | Performed by: FAMILY MEDICINE

## 2022-06-11 PROCEDURE — 85610 PROTHROMBIN TIME: CPT | Performed by: INTERNAL MEDICINE

## 2022-06-11 PROCEDURE — 250N000013 HC RX MED GY IP 250 OP 250 PS 637: Performed by: INTERNAL MEDICINE

## 2022-06-11 PROCEDURE — 97110 THERAPEUTIC EXERCISES: CPT | Mod: GP

## 2022-06-11 PROCEDURE — 36415 COLL VENOUS BLD VENIPUNCTURE: CPT | Performed by: INTERNAL MEDICINE

## 2022-06-11 PROCEDURE — 120N000001 HC R&B MED SURG/OB

## 2022-06-11 RX ORDER — WARFARIN SODIUM 3 MG/1
3 TABLET ORAL
Status: COMPLETED | OUTPATIENT
Start: 2022-06-11 | End: 2022-06-11

## 2022-06-11 RX ADMIN — ATORVASTATIN CALCIUM 10 MG: 10 TABLET, FILM COATED ORAL at 19:13

## 2022-06-11 RX ADMIN — POLYETHYLENE GLYCOL 3350 17 G: 17 POWDER, FOR SOLUTION ORAL at 08:09

## 2022-06-11 RX ADMIN — SENNOSIDES AND DOCUSATE SODIUM 1 TABLET: 8.6; 5 TABLET ORAL at 19:14

## 2022-06-11 RX ADMIN — METOPROLOL TARTRATE 50 MG: 25 TABLET, FILM COATED ORAL at 19:13

## 2022-06-11 RX ADMIN — LEVOTHYROXINE SODIUM 50 MCG: 0.03 TABLET ORAL at 06:15

## 2022-06-11 RX ADMIN — SENNOSIDES AND DOCUSATE SODIUM 1 TABLET: 8.6; 5 TABLET ORAL at 08:09

## 2022-06-11 RX ADMIN — LOSARTAN POTASSIUM 50 MG: 50 TABLET, FILM COATED ORAL at 08:09

## 2022-06-11 RX ADMIN — METOPROLOL TARTRATE 50 MG: 25 TABLET, FILM COATED ORAL at 08:09

## 2022-06-11 RX ADMIN — WARFARIN SODIUM 3 MG: 3 TABLET ORAL at 18:16

## 2022-06-11 ASSESSMENT — ACTIVITIES OF DAILY LIVING (ADL)
ADLS_ACUITY_SCORE: 45
ADLS_ACUITY_SCORE: 49
ADLS_ACUITY_SCORE: 49
ADLS_ACUITY_SCORE: 45
ADLS_ACUITY_SCORE: 49

## 2022-06-11 NOTE — PROGRESS NOTES
[facility]    Medicine Progress Note - Hospitalist Service       Date of Admission:  6/2/2022  Principal Problem:    Hematuria  Active Problems:    (HFpEF) heart failure with preserved ejection fraction (H)    Acquired hypothyroidism    Chronic atrial fibrillation (H)    H/O mitral valve replacement with mechanical valve    Hypertension    Transitional cell carcinoma (H)    Malignant neoplasm of urinary bladder, unspecified site (H)    History of stroke     Assessment & Plan              81-year-old male with a past history of bladder cancer, chronic atrial fibrillation, mechanical mitral valve replacement on warfarin, left MCA CVA in 2019, left subdural hematoma in 2021, hypothyroidism, diastolic CHF admitted for gross hematuria    Hematuria-  Has known transitional cell carcinoma.  CT urogram shows transmural extension of multifocal bladder masses through the bladder wall, no evidence of ureteral obstruction  Urology consulted  Improved hematuria with CBI, now discontinued, Jacques removed  Having more rust colored urine, no further clots.  Hemoglobin very stable.  Okay to discharge from urology perspective with close follow-up with his oncologist/urologist at [facility]  Hematuria continues to improve-only slight pink today  Continue warfarin      History of mechanical mitral valve replacement  Continue warfarin, pharmacy to dose.  Goal 2.5-3.5 INR.  INR 2.45 today, pharmacy dosing     Chronic atrial fibrillation-  Rate controlled  Continue metoprolol and warfarin    Accelerated hypertension-  Improved.  Continue metoprolol and losartan    History of transitional cell carcinoma of the bladder  Has undergone 2 doses of Keytruda starting on 5/2/2022.  Follows with [facility] urology/oncology.  Their plan is to r reassess after 3 months of therapy  Needs to follow-up with them next week    Constipation-  Add MiraLAX to senna,  Having bowel movements    Past history of CVA-  Has mild  "right lower extremity weakness.  PT OT     Diet: Regular Diet Adult  Room Service  Snacks/Supplements Adult: Ensure Enlive; With Meals  Snacks/Supplements Adult: Ensure Enlive; Between Meals    DVT Prophylaxis: Warfarin  Jacques Catheter: Not present  Central Lines: None  Code Status: Full Code      Disposition Plan   Expected Discharge:      Anticipated discharge location:  Awaiting care coordination huddle  Delays:     Placement - TCU            The patient's care was discussed with the Bedside Nurse, Care Coordinator/, Patient, Patient's Family and Urology Consultant. for total time 25 minutes with greater than 50% of total time spent in counseling and coordination of care.    BRITANY DANIEL MD  Hospitalist Service  [facility]  Securely message with the Vocera Web Console (learn more here)  Text page via [employer] Paging/Directory        Clinically Significant Risk Factors Present on Admission                    ______________________________________________________________________    Interval History   Remainder of 12 point review of systems negative except as noted below    Subjective:  Patient continues to improve.  Having bowel movements.  No abdominal pain.  Voiding.  Hematuria improving, light pink today   Data reviewed today: I reviewed all medications, new labs and imaging results over the last 24 hours.     Physical Exam   Vital Signs: Temp: 98.6  F (37  C) Temp src: Oral BP: 128/73 Pulse: 85   Resp: 16 SpO2: 98 % O2 Device: None (Room air)    Weight: 122 lbs 0 oz  Physical Exam:  Temp:  [97.8  F (36.6  C)-98.6  F (37  C)] 98.6  F (37  C)  Pulse:  [] 85  Resp:  [16-20] 16  BP: (128-143)/(61-73) 128/73  SpO2:  [97 %-100 %] 98 %    /73 (BP Location: Left arm)   Pulse 85   Temp 98.6  F (37  C) (Oral)   Resp 16   Ht 1.676 m (5' 6\")   Wt 55.3 kg (122 lb)   SpO2 98%   BMI 19.69 kg/m    General appearance: alert, appears stated age and cooperative  Head: " Normocephalic, without obvious abnormality, atraumatic  Eyes: Clear conjuctiva  Neck: no JVD and supple, symmetrical, trachea midline  Lungs: clear to auscultation bilaterally  Heart irregular rate and rhythm  Abdomen: soft, non-tender; bowel sounds normal; no masses,  no organomegaly  Extremities: Negative homans,   Skin: Skin color, texture, turgor normal. No rashes or lesions  Neurologic: Grossly normal  Urine: Light pink-tinged        Data   Recent Labs   Lab 06/11/22  0709 06/10/22  0747 06/09/22  0730 06/09/22  0654 06/08/22  0647 06/07/22  0624 06/06/22  0713 06/05/22  0720   HGB  --   --  14.8  --  14.7 14.5 14.7 15.0   INR 2.45* 2.22*  --  2.13* 2.27* 2.79* 3.31* 3.34*   CR  --   --   --   --   --   --  0.76 0.83     No results found for this or any previous visit (from the past 24 hour(s)).

## 2022-06-11 NOTE — PLAN OF CARE
Goal Outcome Evaluation:        Problem: Hypertension Comorbidity  Goal: Blood Pressure in Desired Range  Outcome: Ongoing, Progressing  Intervention: Maintain Blood Pressure Management  Recent Flowsheet Documentation  Taken 6/11/2022 0000 by Juan Elkins RN  Medication Review/Management: medications reviewed     Problem: Pain Acute  Goal: Acceptable Pain Control and Functional Ability  Outcome: Ongoing, Progressing  Intervention: Prevent or Manage Pain  Recent Flowsheet Documentation  Taken 6/11/2022 0000 by Juan Elkins RN  Medication Review/Management: medications reviewed        Pt denied and offered no c/o pain this shift, absent of non-verbal indicators. Pt continues to urinate but is incontinent. Urine is rust colored and absent of any noted clots. External Male Cath utilized. VSS. Slept comfortably throughout the NOC. Wife present at bedside. Pt is soft spoken. A1 w/ gaitbelt and FWW. Awaiting placement/prior auth. A&Ox4. Calm and pleasant towards writer and other staff. Receptive to al cares.

## 2022-06-11 NOTE — PLAN OF CARE
Goal Outcome Evaluation:    Plan of Care Reviewed With: patient, spouse     Overall Patient Progress: no change    Outcome Evaluation: Pt awaiting TCU placement.    Pt pleasant. Slightly withdrawn with flat affect. Pt is needing a good amount of encouragement to be up and OOB. Did not want OOB until after breakfast.  Care management working with pt and referrals have been sent out.

## 2022-06-11 NOTE — PLAN OF CARE
Problem: Hypertension Comorbidity  Goal: Blood Pressure in Desired Range  Outcome: Ongoing, Progressing  Intervention: Maintain Blood Pressure Management  Recent Flowsheet Documentation  Taken 6/10/2022 1616 by Saw Iyer RN  Medication Review/Management: medications reviewed     Problem: Pain Acute  Goal: Acceptable Pain Control and Functional Ability  Outcome: Ongoing, Progressing  Intervention: Prevent or Manage Pain  Recent Flowsheet Documentation  Taken 6/10/2022 1616 by Saw Iyer RN  Medication Review/Management: medications reviewed   Goal Outcome Evaluation:      Pt alert and oriented times 4. Vitals stable. Denied any pain. Pt has male external catheter on for his urine. Urine pinkish /rere color without any blood clots at this time.

## 2022-06-12 LAB
ERYTHROCYTE [DISTWIDTH] IN BLOOD BY AUTOMATED COUNT: 12.6 % (ref 10–15)
HCT VFR BLD AUTO: 41.8 % (ref 40–53)
HGB BLD-MCNC: 13.8 G/DL (ref 13.3–17.7)
INR PPP: 2.41 (ref 0.85–1.15)
MCH RBC QN AUTO: 29.9 PG (ref 26.5–33)
MCHC RBC AUTO-ENTMCNC: 33 G/DL (ref 31.5–36.5)
MCV RBC AUTO: 91 FL (ref 78–100)
PLATELET # BLD AUTO: 214 10E3/UL (ref 150–450)
RBC # BLD AUTO: 4.62 10E6/UL (ref 4.4–5.9)
WBC # BLD AUTO: 6.5 10E3/UL (ref 4–11)

## 2022-06-12 PROCEDURE — 250N000013 HC RX MED GY IP 250 OP 250 PS 637: Performed by: INTERNAL MEDICINE

## 2022-06-12 PROCEDURE — 85027 COMPLETE CBC AUTOMATED: CPT | Performed by: FAMILY MEDICINE

## 2022-06-12 PROCEDURE — 99232 SBSQ HOSP IP/OBS MODERATE 35: CPT | Performed by: FAMILY MEDICINE

## 2022-06-12 PROCEDURE — 250N000013 HC RX MED GY IP 250 OP 250 PS 637: Performed by: FAMILY MEDICINE

## 2022-06-12 PROCEDURE — 36415 COLL VENOUS BLD VENIPUNCTURE: CPT | Performed by: FAMILY MEDICINE

## 2022-06-12 PROCEDURE — 85610 PROTHROMBIN TIME: CPT | Performed by: INTERNAL MEDICINE

## 2022-06-12 PROCEDURE — 120N000001 HC R&B MED SURG/OB

## 2022-06-12 RX ORDER — WARFARIN SODIUM 2 MG/1
4 TABLET ORAL
Status: COMPLETED | OUTPATIENT
Start: 2022-06-12 | End: 2022-06-12

## 2022-06-12 RX ADMIN — SENNOSIDES AND DOCUSATE SODIUM 1 TABLET: 8.6; 5 TABLET ORAL at 20:03

## 2022-06-12 RX ADMIN — METOPROLOL TARTRATE 50 MG: 25 TABLET, FILM COATED ORAL at 08:15

## 2022-06-12 RX ADMIN — POLYETHYLENE GLYCOL 3350 17 G: 17 POWDER, FOR SOLUTION ORAL at 08:15

## 2022-06-12 RX ADMIN — METOPROLOL TARTRATE 50 MG: 25 TABLET, FILM COATED ORAL at 20:02

## 2022-06-12 RX ADMIN — ATORVASTATIN CALCIUM 10 MG: 10 TABLET, FILM COATED ORAL at 20:03

## 2022-06-12 RX ADMIN — LOSARTAN POTASSIUM 50 MG: 50 TABLET, FILM COATED ORAL at 08:15

## 2022-06-12 RX ADMIN — WARFARIN SODIUM 4 MG: 2 TABLET ORAL at 17:54

## 2022-06-12 RX ADMIN — SENNOSIDES AND DOCUSATE SODIUM 1 TABLET: 8.6; 5 TABLET ORAL at 08:15

## 2022-06-12 RX ADMIN — LEVOTHYROXINE SODIUM 50 MCG: 0.03 TABLET ORAL at 05:45

## 2022-06-12 ASSESSMENT — ACTIVITIES OF DAILY LIVING (ADL)
ADLS_ACUITY_SCORE: 45

## 2022-06-12 NOTE — PLAN OF CARE
Problem: Pain Acute  Goal: Acceptable Pain Control and Functional Ability  Outcome: Ongoing, Progressing  Intervention: Prevent or Manage Pain  Recent Flowsheet Documentation  Taken 6/11/2022 1009 by Arthur Haywood RN  Medication Review/Management: medications reviewed     Pt denies pain. Pts urine is tea colored. Alert and oriented. Continues to have output from his external catheter.

## 2022-06-12 NOTE — PROGRESS NOTES
[facility]    Medicine Progress Note - Hospitalist Service       Date of Admission:  6/2/2022  Principal Problem:    Hematuria  Active Problems:    (HFpEF) heart failure with preserved ejection fraction (H)    Acquired hypothyroidism    Chronic atrial fibrillation (H)    H/O mitral valve replacement with mechanical valve    Hypertension    Transitional cell carcinoma (H)    Malignant neoplasm of urinary bladder, unspecified site (H)    History of stroke     Assessment & Plan              81-year-old male with a past history of bladder cancer, chronic atrial fibrillation, mechanical mitral valve replacement on warfarin, left MCA CVA in 2019, left subdural hematoma in 2021, hypothyroidism, diastolic CHF admitted for gross hematuria    Hematuria-  Has known transitional cell carcinoma.  CT urogram shows transmural extension of multifocal bladder masses through the bladder wall, no evidence of ureteral obstruction  Urology consulted  Improved hematuria with CBI, now discontinued, Jacques removed  Having more rust colored urine, no further clots.  Hemoglobin very stable.  Okay to discharge from urology perspective with close follow-up with his oncologist/urologist at [facility]  Hematuria continues to improve-no significant gross hematuria today, only tinged with pink  Continue warfarin      History of mechanical mitral valve replacement  Continue warfarin, pharmacy to dose.  Goal 2.5-3.5 INR.  INR 2.41 today, pharmacy dosing     Chronic atrial fibrillation-  Rate controlled  Continue metoprolol and warfarin    Accelerated hypertension-  Improved.  Continue metoprolol and losartan    History of transitional cell carcinoma of the bladder  Has undergone 2 doses of Keytruda starting on 5/2/2022.  Follows with [facility] urology/oncology.  Their plan is to r reassess after 3 months of therapy  Needs to follow-up with them after discharge    Constipation-  Add MiraLAX to senna,  Having bowel  "movements    Past history of CVA-  Has mild right lower extremity weakness.  PT OT     Diet: Regular Diet Adult  Room Service  Snacks/Supplements Adult: Ensure Enlive; With Meals  Snacks/Supplements Adult: Ensure Enlive; Between Meals    DVT Prophylaxis: Warfarin  Jacques Catheter: Not present  Central Lines: None  Code Status: Full Code      Disposition Plan   Expected Discharge:      Anticipated discharge location:  Awaiting care coordination huddle  Delays:     Placement - TCU            The patient's care was discussed with the Bedside Nurse, Care Coordinator/, Patient, Patient's Family and Urology Consultant. for total time 25 minutes with greater than 50% of total time spent in counseling and coordination of care.    BRITANY DANIEL MD  Hospitalist Service  [facility]  Securely message with the Vocera Web Console (learn more here)  Text page via [employer] Paging/Directory        Clinically Significant Risk Factors Present on Admission                    ______________________________________________________________________    Interval History   Remainder of 12 point review of systems negative except as noted below    Subjective:  Patient doing well.  Having bowel movements.  Hematuria continues to improve.  No abdominal pain.  Data reviewed today: I reviewed all medications, new labs and imaging results over the last 24 hours.     Physical Exam   Vital Signs: Temp: 97.7  F (36.5  C) Temp src: Oral BP: 137/60 Pulse: 67   Resp: 20 SpO2: 99 % O2 Device: None (Room air)    Weight: 122 lbs 0 oz  Physical Exam:  Temp:  [97.7  F (36.5  C)-98.1  F (36.7  C)] 97.7  F (36.5  C)  Pulse:  [] 67  Resp:  [18-20] 20  BP: (105-140)/(57-63) 137/60  SpO2:  [90 %-99 %] 99 %    /60   Pulse 67   Temp 97.7  F (36.5  C) (Oral)   Resp 20   Ht 1.676 m (5' 6\")   Wt 55.3 kg (122 lb)   SpO2 99%   BMI 19.69 kg/m    General appearance: alert, appears stated age and cooperative  Head: " Normocephalic, without obvious abnormality, atraumatic  Eyes: Clear conjuctiva  Neck: no JVD and supple, symmetrical, trachea midline  Lungs: clear to auscultation bilaterally  Heart irregular rate and rhythm  Abdomen: soft, non-tender; bowel sounds normal; no masses,  no organomegaly  Extremities: Negative homans,   Skin: Skin color, texture, turgor normal. No rashes or lesions  Neurologic: Grossly normal  Urine: Light pink-tinged        Data   Recent Labs   Lab 06/12/22  0648 06/11/22  0709 06/10/22  0747 06/09/22  0730 06/09/22  0654 06/08/22  0647 06/07/22  0624 06/06/22  0713   WBC 6.5  --   --   --   --   --   --   --    HGB 13.8  --   --  14.8  --  14.7   < > 14.7   MCV 91  --   --   --   --   --   --   --      --   --   --   --   --   --   --    INR 2.41* 2.45* 2.22*  --    < > 2.27*   < > 3.31*   CR  --   --   --   --   --   --   --  0.76    < > = values in this interval not displayed.     No results found for this or any previous visit (from the past 24 hour(s)).

## 2022-06-12 NOTE — PLAN OF CARE
Patient tolerating diet, wife brought in food for patient. Male external catheter in place with good output, tea colored urine. Denies pain. Meds crushed in applesauce.     Bhavana Jackson RN

## 2022-06-12 NOTE — PLAN OF CARE
Goal Outcome Evaluation:    Plan of Care Reviewed With: patient, spouse     Overall Patient Progress: no change    Outcome Evaluation: Patient using external catheter r/t incontinence, urine is tea colored, no blood or clots noted. Pt needs encouragement to be OOB, refused PT this afternoon.

## 2022-06-13 ENCOUNTER — APPOINTMENT (OUTPATIENT)
Dept: OCCUPATIONAL THERAPY | Facility: HOSPITAL | Age: 82
DRG: 686 | End: 2022-06-13
Payer: COMMERCIAL

## 2022-06-13 ENCOUNTER — APPOINTMENT (OUTPATIENT)
Dept: PHYSICAL THERAPY | Facility: HOSPITAL | Age: 82
DRG: 686 | End: 2022-06-13
Payer: COMMERCIAL

## 2022-06-13 VITALS
HEART RATE: 68 BPM | OXYGEN SATURATION: 99 % | SYSTOLIC BLOOD PRESSURE: 122 MMHG | BODY MASS INDEX: 18.48 KG/M2 | HEIGHT: 66 IN | TEMPERATURE: 98.5 F | RESPIRATION RATE: 16 BRPM | DIASTOLIC BLOOD PRESSURE: 86 MMHG | WEIGHT: 115 LBS

## 2022-06-13 LAB
HGB BLD-MCNC: 14.3 G/DL (ref 13.3–17.7)
HOLD SPECIMEN: NORMAL
INR PPP: 2.57 (ref 0.85–1.15)

## 2022-06-13 PROCEDURE — 36415 COLL VENOUS BLD VENIPUNCTURE: CPT | Performed by: FAMILY MEDICINE

## 2022-06-13 PROCEDURE — 250N000013 HC RX MED GY IP 250 OP 250 PS 637: Performed by: FAMILY MEDICINE

## 2022-06-13 PROCEDURE — 85018 HEMOGLOBIN: CPT | Performed by: FAMILY MEDICINE

## 2022-06-13 PROCEDURE — 97530 THERAPEUTIC ACTIVITIES: CPT | Mod: GP

## 2022-06-13 PROCEDURE — 97116 GAIT TRAINING THERAPY: CPT | Mod: GP

## 2022-06-13 PROCEDURE — 85610 PROTHROMBIN TIME: CPT | Performed by: INTERNAL MEDICINE

## 2022-06-13 PROCEDURE — 250N000013 HC RX MED GY IP 250 OP 250 PS 637: Performed by: INTERNAL MEDICINE

## 2022-06-13 PROCEDURE — 99239 HOSP IP/OBS DSCHRG MGMT >30: CPT | Performed by: FAMILY MEDICINE

## 2022-06-13 PROCEDURE — 97535 SELF CARE MNGMENT TRAINING: CPT | Mod: GO

## 2022-06-13 RX ORDER — WARFARIN SODIUM 2 MG/1
TABLET ORAL
Qty: 30 TABLET | Refills: 0
Start: 2022-06-13

## 2022-06-13 RX ORDER — AMOXICILLIN 250 MG
1 CAPSULE ORAL DAILY
Start: 2022-06-13

## 2022-06-13 RX ORDER — WARFARIN SODIUM 2 MG/1
TABLET ORAL
Qty: 30 TABLET | Refills: 0
Start: 2022-06-13 | End: 2022-06-13

## 2022-06-13 RX ORDER — POLYETHYLENE GLYCOL 3350 17 G/17G
17 POWDER, FOR SOLUTION ORAL DAILY PRN
Start: 2022-06-13

## 2022-06-13 RX ORDER — ATORVASTATIN CALCIUM 10 MG/1
10 TABLET, FILM COATED ORAL AT BEDTIME
Start: 2022-06-13

## 2022-06-13 RX ORDER — METOPROLOL TARTRATE 50 MG
50 TABLET ORAL 2 TIMES DAILY
Start: 2022-06-13

## 2022-06-13 RX ORDER — WARFARIN SODIUM 3 MG/1
3 TABLET ORAL
Status: DISCONTINUED | OUTPATIENT
Start: 2022-06-13 | End: 2022-06-13 | Stop reason: HOSPADM

## 2022-06-13 RX ORDER — LEVOTHYROXINE SODIUM 50 UG/1
50 TABLET ORAL DAILY
Start: 2022-06-13

## 2022-06-13 RX ADMIN — SENNOSIDES AND DOCUSATE SODIUM 1 TABLET: 8.6; 5 TABLET ORAL at 09:29

## 2022-06-13 RX ADMIN — LOSARTAN POTASSIUM 50 MG: 50 TABLET, FILM COATED ORAL at 09:28

## 2022-06-13 RX ADMIN — METOPROLOL TARTRATE 50 MG: 25 TABLET, FILM COATED ORAL at 09:29

## 2022-06-13 RX ADMIN — POLYETHYLENE GLYCOL 3350 17 G: 17 POWDER, FOR SOLUTION ORAL at 09:29

## 2022-06-13 RX ADMIN — LEVOTHYROXINE SODIUM 50 MCG: 0.03 TABLET ORAL at 05:57

## 2022-06-13 ASSESSMENT — ACTIVITIES OF DAILY LIVING (ADL)
ADLS_ACUITY_SCORE: 45
ADLS_ACUITY_SCORE: 44
ADLS_ACUITY_SCORE: 45
ADLS_ACUITY_SCORE: 44
ADLS_ACUITY_SCORE: 45
ADLS_ACUITY_SCORE: 44
ADLS_ACUITY_SCORE: 44
ADLS_ACUITY_SCORE: 45

## 2022-06-13 NOTE — PLAN OF CARE
Patient tolerating diet. Denies pain. Wife at bedside. External catheter with good output, tea colored urine. Anticipating discharge to TCU tomorrow. Assist of 2 with transfers.     Bhavana Jackson RN

## 2022-06-13 NOTE — SIGNIFICANT EVENT
Orders to discharge patient home. Patient discharged to home with wife. Patient and wife verbalized understanding of discharge instructions.

## 2022-06-13 NOTE — PLAN OF CARE
Problem: Plan of Care - These are the overarching goals to be used throughout the patient stay.    Goal: Plan of Care Review/Shift Note  Description: The Plan of Care Review/Shift note should be completed every shift.  The Outcome Evaluation is a brief statement about your assessment that the patient is improving, declining, or no change.  This information will be displayed automatically on your shift note.  Outcome: Ongoing, Progressing   Pt continues to have tea colored output from the external catheter. Pt denies pain. No new concerns noted.

## 2022-06-13 NOTE — DISCHARGE INSTRUCTIONS
Home Care referral has been made for you with [facility] [phone]. They will call you to arrange home visit.

## 2022-06-13 NOTE — PLAN OF CARE
Physical Therapy Discharge Summary    Reason for therapy discharge:    Discharged to home with home therapy.    Progress towards therapy goal(s). See goals on Care Plan in [facility] electronic health record for goal details.  Goals partially met.  Barriers to achieving goals:   discharge from facility.    Therapy recommendation(s):    Continued therapy is recommended.  Rationale/Recommendations:  recommending home care PT.

## 2022-06-13 NOTE — PROGRESS NOTES
Care Management Discharge Note    Discharge Date: 06/13/2022       Discharge Disposition: Home    Discharge Services:  (home care)    Discharge DME: None    Discharge Transportation: family      Education Provided on the Discharge Plan:  yes  Persons Notified of Discharge Plans:  yes    Patient/Family in Agreement with the Plan: yes    Handoff Referral Completed:  yes    Additional Information:    Patient cleared for home discharge. He will return home with support from spouse. [facility] accepted for RN/PT/OT. Confirmed discharge plan with patient and spouse. They state understanding for follow up need with pcp and Urology. Home care orders faxed.        Rosie Elkins RN

## 2022-06-13 NOTE — PLAN OF CARE
Occupational Therapy Discharge Summary    Reason for therapy discharge:    Discharged to home.    Progress towards therapy goal(s). See goals on Care Plan in [facility] electronic health record for goal details.  Goals partially met.  Barriers to achieving goals:   discharge from facility.    Therapy recommendation(s):    Continued therapy is not needed.     Goal Outcome Evaluation:

## 2022-06-14 ENCOUNTER — PATIENT OUTREACH (OUTPATIENT)
Dept: CARE COORDINATION | Facility: CLINIC | Age: 82
End: 2022-06-14
Payer: COMMERCIAL

## 2022-06-14 DIAGNOSIS — Z71.89 OTHER SPECIFIED COUNSELING: ICD-10-CM

## 2022-06-14 NOTE — DISCHARGE SUMMARY
[facility]  Hospitalist Discharge Summary      Date of Admission:  6/2/2022  Date of Discharge:  6/13/2022  4:14 PM  Discharging Provider: BRITANY DANIEL MD      Discharge Diagnoses   Principal Problem:    Hematuria  Active Problems:    (HFpEF) heart failure with preserved ejection fraction (H)    Acquired hypothyroidism    Chronic atrial fibrillation (H)    H/O mitral valve replacement with mechanical valve    Hypertension    Transitional cell carcinoma (H)    Malignant neoplasm of urinary bladder, unspecified site (H)    History of stroke       Discharge Procedure Orders   Home Care Referral   Referral Priority: Routine: Next available opening Referral Type: Home Health Therapies & Aides   Number of Visits Requested: 1     Reason for your hospital stay   Order Comments: Hematuria     Follow-up and recommended labs and tests   Order Comments: Follow up with primary care provider, [facility], within 3 days to evaluate medication change and for hospital follow- up.  The following labs/tests are recommended: INR, CBC.     Activity   Order Comments: Your activity upon discharge: activity as tolerated     Order Specific Question Answer Comments   Is discharge order? Yes      When to contact your care team   Order Comments: Call your primary doctor if you have any of the following: temperature greater than 100.5,  increased shortness of breath or increased pain.     Discharge Instructions   Order Comments: Follow up with [facility] oncology and urology in 1 week     Diet   Order Comments: Follow this diet upon discharge: Orders Placed This Encounter      Room Service      Snacks/Supplements Adult: Ensure Enlive; With Meals      Snacks/Supplements Adult: Ensure Enlive; Between Meals      Regular Diet Adult       Order Specific Question Answer Comments   Is discharge order? Yes           Hospital Course   81-year-old male with a past history of bladder cancer, chronic atrial  fibrillation, mechanical mitral valve replacement on warfarin, left MCA CVA in 2019, left subdural hematoma in 2021, hypothyroidism, diastolic CHF admitted for gross hematuria     Hematuria-  Has known transitional cell carcinoma.  CT urogram shows transmural extension of multifocal bladder masses through the bladder wall, no evidence of ureteral obstruction  Urology consulted  Improved hematuria with CBI, now discontinued, Jacques removed    Hemoglobin very stable.  Okay to discharge from urology perspective with close follow-up with his oncologist/urologist at [facility]  Hematuria continues to improve-no significant gross hematuria today  Continue warfarin        History of mechanical mitral valve replacement  Continue warfarin, pharmacy to dose.  Goal 2.5-3.5 INR.  INR 2.57 today, pharmacy dosing      Chronic atrial fibrillation-  Rate controlled  Continue metoprolol and warfarin     Accelerated hypertension-  Improved.  Continue metoprolol and losartan     History of transitional cell carcinoma of the bladder  Has undergone 2 doses of Keytruda starting on 5/2/2022.  Follows with [facility] urology/oncology.  Their plan is to r reassess after 3 months of therapy  Needs to follow-up with them after discharge     Constipation-  Add MiraLAX to senna,  Having bowel movements     Past history of CVA-  Has mild right lower extremity weakness.  PT OT    Severe malnutrition  In Context of:  Chronic illness or disease  Seen by dietitian, supplements added.  Appetite improved at discharge     Consultations This Hospital Stay   PHARMACY TO DOSE WARFARIN  UROLOGY IP CONSULT  PHYSICAL THERAPY ADULT IP CONSULT  OCCUPATIONAL THERAPY ADULT IP CONSULT    Code Status   Prior         Greater than 35 minutes spent on coordinating discharge, evaluating labs, studies, evaluating patient, evaluating specialist notes    BRITANY DANIEL MD  [address] [address]  Phone:  [phone]  Fax: [phone]  ______________________________________________________________________         Primary Care Physician   [facility]    Discharge Orders      Home Care Referral      Reason for your hospital stay    Hematuria     Follow-up and recommended labs and tests    Follow up with primary care provider, [facility], within 3 days to evaluate medication change and for hospital follow- up.  The following labs/tests are recommended: INR, CBC.     Activity    Your activity upon discharge: activity as tolerated     When to contact your care team    Call your primary doctor if you have any of the following: temperature greater than 100.5,  increased shortness of breath or increased pain.     Discharge Instructions    Follow up with  oncology and urology in 1 week     Diet    Follow this diet upon discharge: Orders Placed This Encounter      Room Service      Snacks/Supplements Adult: Ensure Enlive; With Meals      Snacks/Supplements Adult: Ensure Enlive; Between Meals      Regular Diet Adult         Significant Results and Procedures   Most Recent 3 CBC's:Recent Labs   Lab Test 06/13/22  0807 06/12/22  0648 06/09/22  0730 06/04/22  0700 06/03/22  0611 06/02/22  1621   WBC  --  6.5  --   --  8.8 6.7   HGB 14.3 13.8 14.8   < > 14.5 15.3   MCV  --  91  --   --  91 92   PLT  --  214  --   --  178 193    < > = values in this interval not displayed.     Most Recent 3 BMP's:Recent Labs   Lab Test 06/06/22  0713 06/05/22  0720 06/02/22  1805 03/05/21  0912 02/19/21  0458   NA  --   --  138 137 136   POTASSIUM  --   --  4.0 4.3 4.8   CHLORIDE  --   --  105 103 103   CO2  --   --  24 24 26   BUN  --   --  16 26 22   CR 0.76 0.83 0.89 0.72 0.75   ANIONGAP  --   --  9 10 7   JEANETH  --   --  9.4 8.3* 8.6   GLC  --   --  122 140* 94     Most Recent 2 LFT's:No lab results found.  Most Recent 3 INR's:Recent Labs   Lab Test 06/13/22  0807 06/12/22  0648 06/11/22  0709   INR  2.57* 2.41* 2.45*     Most Recent INR's and Anticoagulation Dosing History:  Anticoagulation Dose History     Recent Dosing and Labs Latest Ref Rng & Units 6/7/2022 6/8/2022 6/9/2022 6/10/2022 6/11/2022 6/12/2022 6/13/2022    Warfarin 2 mg - 2 mg - 4 mg - - 4 mg -    Warfarin 3 mg - - 3 mg - 3 mg 3 mg - -    INR 0.85 - 1.15 2.79(H) 2.27(H) 2.13(H) 2.22(H) 2.45(H) 2.41(H) 2.57(H)      ,   Results for orders placed or performed during the hospital encounter of 06/02/22   CT Urogram wo & w Contrast    Narrative    EXAM: CT UROGRAM WO and W CONTRAST  LOCATION: [facility]  DATE/TIME: 6/2/2022 7:33 PM    INDICATION: Hematuria  COMPARISON: None.  TECHNIQUE: CT scan of the abdomen and pelvis using urogram technique with pre contrast, post contrast, and delayed images. Multiplanar reformats were obtained. Dose reduction techniques were used.   CONTRAST: Isovue 370 100 mL    FINDINGS:   LOWER CHEST: Normal.    HEPATOBILIARY: Normal.    PANCREAS: Normal.    SPLEEN: Normal.    ADRENAL GLANDS: Normal.    RIGHT KIDNEY/URETER: Moderate cortical scarring and parenchymal volume loss of the kidney. There is a benign-appearing 3 cm cortical cyst involving the upper pole. No renal or ureteral calculi. No filling defects in the ureter.    LEFT KIDNEY/URETER: No renal or ureteral calculi. Normal enhancement without focal mass. No filling defects in the collecting system.    BLADDER: Multifocal mucosal based masses within the bladder. The largest lesion involves the right anterior portion of the bladder measuring approximate 3.2 x 1.9 cm. The mass shows transmural extension with involvement of the bladder serosa/adventitia.   There is a second mass immediately adjacent to this lesion measuring up to 1.5 cm and also appears to extend into the bladder wall musculature. There are 2 other lesions including a smaller mass along the right lateral bladder wall measuring 0.9 cm and   the lesion left anterior bladder wall  measuring approximately 1.4 x 1.4 cm. This likely accounts for the patient's hematuria and concerning for multifocal transitional cell carcinoma.    BOWEL: Increased stool volume noted throughout the colon without obstructive changes or inflammation. Scattered colonic diverticula.    LYMPH NODES: No lymphadenopathy.    VASCULATURE: Moderate atherosclerotic changes of the abdominal aorta without aneurysm.    PELVIC ORGANS: Mild prostate gland enlargement with mass effect upon the base of the urinary bladder. No free fluid.    MUSCULOSKELETAL: No sclerotic bone lesions.      Impression    IMPRESSION:  1.  Multifocal bladder masses with a dominant lesion right anterior portion of the bladder which has transmural extension through the bladder wall. There is hazy changes of the perivesicular fat which could reflect tumor extension. The other separate   bladder wall lesions are smaller but likely reflect the same process and represent multifocal transitional cell carcinoma.  2.  No lymphadenopathy or sclerotic bone lesions.  3.  No evidence for ureteral obstruction.  4.  Moderate cortical scarring right kidney with benign right renal cyst requires no follow-up. The left kidney appears normal.  5.  Increased stool volume throughout the colon without obstruction.  6.  Mild prostate gland enlargement.   US Bladder Only    Narrative    EXAM: US BLADDER ONLY  LOCATION: [facility]  DATE/TIME: 6/3/2022 12:35 PM    INDICATION: hematuria, concerns for large bladder clot, CBI running, admitted with hematuria, h o bladder tumor and previous bladder rupture  COMPARISON: None.  TECHNIQUE: Routine.    FINDINGS: Bladder is completely decompressed with a Jacques catheter. Minimal amount of debris. Nothing concerning for a hematoma.      Impression    IMPRESSION:  1.  Decompressed bladder.    2.  No hematoma.       Discharge Medications   Discharge Medication List as of 6/13/2022  3:42 PM      CONTINUE these  medications which have NOT CHANGED    Details   amoxicillin (AMOXIL) 500 MG capsule Take 2,000 mg by mouth once as needed (1 hour before dental appointments), Historical      losartan (COZAAR) 50 MG tablet Take 50 mg by mouth every evening, Historical      vitamin D3 (CHOLECALCIFEROL) 50 mcg (2000 units) tablet Take 1 tablet by mouth daily, Historical      atorvastatin (LIPITOR) 10 MG tablet [ATORVASTATIN (LIPITOR) 10 MG TABLET] 1 tablet (10 mg total) by Enteral Tube route at bedtime., Disp-30 tablet, R-0, Print      levothyroxine (SYNTHROID, LEVOTHROID) 50 MCG tablet [LEVOTHYROXINE (SYNTHROID, LEVOTHROID) 50 MCG TABLET] 1 tablet (50 mcg total) by Enteral Tube route Daily at 6:00 am., Disp-30 tablet, R-0, Print      metoprolol tartrate (LOPRESSOR) 50 MG tablet [METOPROLOL TARTRATE (LOPRESSOR) 50 MG TABLET] 1 tablet (50 mg total) by Enteral Tube route 2 (two) times a day., Disp-60 tablet, R-0, Print      polyethylene glycol (MIRALAX) 17 gram packet [POLYETHYLENE GLYCOL (MIRALAX) 17 GRAM PACKET] 1 packet (17 g total) by Enteral Tube route daily as needed., Disp-14 packet, R-0, Print      senna-docusate (SENNOSIDES-DOCUSATE SODIUM) 8.6-50 mg tablet [SENNA-DOCUSATE (SENNOSIDES-DOCUSATE SODIUM) 8.6-50 MG TABLET] 1 tablet by Enteral Tube route 2 (two) times a day. 7/9/19 discharge orders - May use senna twice daily until you have had a couple soft stools, Disp-30 tablet, R-0, Print      warfarin (COUMADIN/JANTOVEN) 2 MG tablet [WARFARIN (COUMADIN/JANTOVEN) 2 MG TABLET] 2.5 tablets (5 mg total) by Enteral Tube route See Admin Instructions. Take 4 mg by mouth daily. Adjust dose based on INR,  INR goal 2.5-3.5, Disp-30 tablet, R-0, Print         STOP taking these medications       oxyCODONE (ROXICODONE) 5 MG immediate release tablet Comments:   Reason for Stopping:             Allergies   No Known Allergies    Physical Exam:       /86 (BP Location: Right arm)   Pulse 68   Temp 98.5  F (36.9  C) (Oral)   Resp 16    "Ht 1.676 m (5' 6\")   Wt 52.2 kg (115 lb)   SpO2 99%   BMI 18.56 kg/m    General appearance: alert, appears stated age and cooperative  Head: Normocephalic, without obvious abnormality, atraumatic  Eyes: Clear conjuctiva  Neck: no JVD and supple, symmetrical, trachea midline  Lungs: clear to auscultation bilaterally  Heart: irregularly irregular rhythm  Abdomen: soft, non-tender; bowel sounds normal; no masses,  no organomegaly  Extremities: Wesley's sign is negative, no sign of DVT  Skin: Skin color, texture, turgor normal. No rashes or lesions  Neurologic: Grossly normal  Except mild R LE weakness      k  "

## 2022-06-14 NOTE — PROGRESS NOTES
"Clinic Care Coordination Contact  [facility]: Post-Discharge Note  SITUATION                                                      Admission:    Admission Date: 06/02/22   Reason for Admission: Hematuria  Discharge:   Discharge Date: 06/13/22  Discharge Diagnosis: Hematuria    BACKGROUND                                                      Per hospital discharge summary and inpatient provider notes:  81-year-old male with history of known bladder cancer, prior bladder perforation while on CBI, history of mechanical mitral valve replacement on warfarin, chronic A. fib, prior stroke, hypothyroidism and heart failure with preserved EF presents with gross hematuria  Bleeding started around noon on 6-22.  Patient currently undergoing treatment with pembrolizumab  Patient notes history of bladder perforation with CBI in the past  Currently the patient denies any associated complaints  Review of system positive for chronic right lower extremity mild weakness related to prior stroke  Remainder of ROS negative. Denies any other exacerbating or improving factors.       ASSESSMENT      Enrollment  Primary Care Care Coordination Status: Not a Candidate    Discharge Assessment  How are you doing now that you are home?: \" Doing okay just laying in bed\"  How are your symptoms? (Red Flag symptoms escalate to triage hotline per guidelines): Improved  Do you feel your condition is stable enough to be safe at home until your provider visit?: Yes  Does the patient have their discharge instructions? : Yes  Does the patient have questions regarding their discharge instructions? : No  Were you started on any new medications or were there changes to any of your previous medications? : No  Does the patient have all of their medications?: Yes  Do you have questions regarding any of your medications? : No  Do you have all of your needed medical supplies or equipment (DME)?  (i.e. oxygen tank, CPAP, cane, etc.): Yes  Discharge follow-up " appointment scheduled within 14 calendar days? : Yes  Discharge Follow Up Appointment Date: 06/30/22  Discharge Follow Up Appointment Scheduled with?: Specialty Care Provider    Post-op ([facility] Only)  If the patient had a surgery or procedure, do they have any questions for a nurse?: No             PLAN                                                      Outpatient Plan:   Your activity upon discharge: activity as tolerated  Follow this diet upon discharge: Orders Placed This Encounter  Room Service  Snacks/Supplements Adult: Ensure Enlive; With Meals  Snacks/Supplements Adult: Ensure Enlive; Between Meals  Regular Diet Adult  Discharge Instructions  Follow up with [facility] oncology and urology in 1 week  Follow-up and recommended labs and tests  Follow up with primary care provider, [facility], within 3 days to evaluate medication change and  for hospital follow- up. The following labs/tests are recommended: INR, CBC.  No future appointments.      For any urgent concerns, please contact our 24 hour nurse triage line: [phone] ([phone])         Martha Ha MA